# Patient Record
Sex: MALE | Race: BLACK OR AFRICAN AMERICAN | NOT HISPANIC OR LATINO | Employment: OTHER | URBAN - NONMETROPOLITAN AREA
[De-identification: names, ages, dates, MRNs, and addresses within clinical notes are randomized per-mention and may not be internally consistent; named-entity substitution may affect disease eponyms.]

---

## 2022-07-20 ENCOUNTER — OFFICE VISIT (OUTPATIENT)
Dept: GASTROENTEROLOGY | Facility: CLINIC | Age: 62
End: 2022-07-20

## 2022-07-20 VITALS
BODY MASS INDEX: 31.83 KG/M2 | DIASTOLIC BLOOD PRESSURE: 78 MMHG | HEIGHT: 72 IN | HEART RATE: 74 BPM | SYSTOLIC BLOOD PRESSURE: 134 MMHG | OXYGEN SATURATION: 97 % | TEMPERATURE: 98 F | WEIGHT: 235 LBS

## 2022-07-20 DIAGNOSIS — Z01.818 PREOPERATIVE TESTING: Primary | ICD-10-CM

## 2022-07-20 DIAGNOSIS — Z12.11 ENCOUNTER FOR SCREENING FOR MALIGNANT NEOPLASM OF COLON: Primary | ICD-10-CM

## 2022-07-20 DIAGNOSIS — K21.9 GASTROESOPHAGEAL REFLUX DISEASE, UNSPECIFIED WHETHER ESOPHAGITIS PRESENT: ICD-10-CM

## 2022-07-20 PROCEDURE — 99204 OFFICE O/P NEW MOD 45 MIN: CPT | Performed by: NURSE PRACTITIONER

## 2022-07-20 RX ORDER — POLYETHYLENE GLYCOL 3350 17 G/17G
238 POWDER, FOR SOLUTION ORAL ONCE
Qty: 238 G | Refills: 0 | Status: SHIPPED | OUTPATIENT
Start: 2022-07-20 | End: 2022-07-20

## 2022-07-20 RX ORDER — GABAPENTIN 300 MG/1
300 CAPSULE ORAL 3 TIMES DAILY
COMMUNITY

## 2022-07-20 RX ORDER — TRIAMTERENE AND HYDROCHLOROTHIAZIDE 75; 50 MG/1; MG/1
1 TABLET ORAL DAILY
COMMUNITY

## 2022-07-20 NOTE — PROGRESS NOTES
Chief Complaint   Patient presents with   • Colonoscopy     Screening colon       PCP: Joshua Romero MD  REFER: No ref. provider found    Subjective     HPI    Red Carver is a 61 y.o. male who presents to office for preventative maintenance.  There is not  a personal history of colon polyps.  There is not a history of colon cancer.  He does not have complaints of nausea/vomiting, change in bowels, weight loss, no BRBPR, no melena.  There is not a family history of colon cancer in first degree relative.  There is not a family history of colon polyps in first degree relative.  His last colonoscopy-no previous colonoscopy .  Bowels do not move on regular basis.  He will take a laxative at times to help facilitate.  Bowels dependant on what he eats.  He notes if he eats a lot vegetables he doesn't have difficulty.  He notes more constipation if he consumes more protein.    He has intermittent acid reflux.  He was treated for acid reflux in past (10 years ago).  Occasionally has experienced  acidic taste in his mouth more often.  This is noted more when he lies down flat.  No dysphagia.  No current PPI therapy.     Past Medical History:   Diagnosis Date   • Hypertension      History reviewed. No pertinent surgical history.  Outpatient Medications Marked as Taking for the 7/20/22 encounter (Office Visit) with Melecio Basurto APRN   Medication Sig Dispense Refill   • gabapentin (NEURONTIN) 300 MG capsule Take 300 mg by mouth 3 (Three) Times a Day.     • triamterene-hydrochlorothiazide (MAXZIDE) 75-50 MG per tablet Take 1 tablet by mouth Daily.       No Known Allergies  Social History     Socioeconomic History   • Marital status: Single   Tobacco Use   • Smoking status: Former Smoker   • Smokeless tobacco: Never Used   Vaping Use   • Vaping Use: Never used   Substance and Sexual Activity   • Alcohol use: Yes     Comment: rare   • Drug use: Yes     Types: Marijuana     Review of Systems   Constitutional:  Negative for unexpected weight change.   Respiratory: Negative for shortness of breath.    Cardiovascular: Negative for chest pain.   Gastrointestinal: Negative for abdominal pain and anal bleeding.     Objective   Vitals:    07/20/22 0915   BP: 134/78   Pulse: 74   Temp: 98 °F (36.7 °C)   SpO2: 97%     Physical Exam  Constitutional:       Appearance: Normal appearance. He is well-developed.   Eyes:      General: No scleral icterus.  Cardiovascular:      Rate and Rhythm: Regular rhythm.      Heart sounds: Normal heart sounds. No murmur heard.  Pulmonary:      Effort: Pulmonary effort is normal. No accessory muscle usage.      Breath sounds: Normal breath sounds.   Abdominal:      General: Bowel sounds are normal. There is no distension.      Palpations: Abdomen is soft. There is no mass.      Tenderness: There is no abdominal tenderness. There is no guarding or rebound.   Skin:     General: Skin is warm and dry.      Coloration: Skin is not jaundiced.   Neurological:      Mental Status: He is alert.   Psychiatric:         Behavior: Behavior is cooperative.       Imaging Results (Most Recent)     None        Body mass index is 31.87 kg/m².    Assessment & Plan   Diagnoses and all orders for this visit:    1. Encounter for screening for malignant neoplasm of colon (Primary)  -     Case Request; Standing  -     Case Request    2. Gastroesophageal reflux disease, unspecified whether esophagitis present  -     Case Request; Standing  -     Case Request    Other orders  -     polyethylene glycol (MIRALAX) 17 GM/SCOOP powder; Take 238 g by mouth 1 (One) Time for 1 dose. Take as directed  Dispense: 238 g; Refill: 0      COLONOSCOPY WITH ANESTHESIA (N/A), ESOPHAGOGASTRODUODENOSCOPY WITH ANESTHESIA (N/A)    miralax as needed    Defer initiation of PPI after EGD evaluation  Gaviscon/tums prn     Advised pt to stop use of NSAIDs, Fish Oil, and MV 5 days prior to procedure, per Dr Bertrand protocol.  Tylenol based products are ok  to take.  Pt verbalized understanding.     All risks, benefits, alternatives, and indications of colonoscopy procedure have been discussed with the patient. Risks to include perforation of the colon requiring possible surgery or colostomy, risk of bleeding from biopsies or removal of colon tissue, possibility of missing a colon polyp or cancer, or adverse drug reaction.  Benefits to include the diagnosis and management of disease of the colon and rectum. Alternatives to include barium enema, radiographic evaluation, lab testing or no intervention. He verbalizes understanding and agrees.     The risks of the endoscopy were discussed in detail.  We discussed the risks of perforation (one out of 9229-6433, riskier with dilation), bleeding (one out of 500), and the rare risks of infection, adverse reaction to anesthesia, respiratory failure, cardiac failure including MI and adverse reaction to medications, etc.  We discussed consequences that could occur if a risk were to develop such as the need for hospitalization, blood transfusion, surgical intervention, medications, pain and disability and death.  Alternatives include not doing anything, or pursuing an UGI series which only offers a diagnosis with potential less accuracy compared to EGD.  The patient verbalizes understanding and agrees to proceed.  Precautions are currently being put in place due to COVID-19.  I have explained to Red Carver they will be required to undergo COVID testing prior to their EGD.  Red Carver verbalized understanding and was willing to proceed.           Melecio Basurto, APRN  07/20/22        There are no Patient Instructions on file for this visit.

## 2022-07-21 PROBLEM — Z12.11 ENCOUNTER FOR SCREENING FOR MALIGNANT NEOPLASM OF COLON: Status: ACTIVE | Noted: 2022-07-21

## 2022-07-21 PROBLEM — K21.9 GASTROESOPHAGEAL REFLUX DISEASE: Status: ACTIVE | Noted: 2022-07-21

## 2022-08-08 ENCOUNTER — LAB (OUTPATIENT)
Dept: LAB | Facility: HOSPITAL | Age: 62
End: 2022-08-08

## 2022-08-08 LAB — SARS-COV-2 ORF1AB RESP QL NAA+PROBE: NOT DETECTED

## 2022-08-08 PROCEDURE — C9803 HOPD COVID-19 SPEC COLLECT: HCPCS

## 2022-08-08 PROCEDURE — U0004 COV-19 TEST NON-CDC HGH THRU: HCPCS | Performed by: NURSE PRACTITIONER

## 2022-08-11 ENCOUNTER — HOSPITAL ENCOUNTER (OUTPATIENT)
Facility: HOSPITAL | Age: 62
Setting detail: HOSPITAL OUTPATIENT SURGERY
Discharge: HOME | End: 2022-08-11
Attending: INTERNAL MEDICINE | Admitting: INTERNAL MEDICINE

## 2022-08-11 ENCOUNTER — ANESTHESIA EVENT (OUTPATIENT)
Dept: GASTROENTEROLOGY | Facility: HOSPITAL | Age: 62
End: 2022-08-11

## 2022-08-11 ENCOUNTER — ANESTHESIA (OUTPATIENT)
Dept: GASTROENTEROLOGY | Facility: HOSPITAL | Age: 62
End: 2022-08-11

## 2022-08-11 VITALS
HEART RATE: 79 BPM | TEMPERATURE: 97.1 F | BODY MASS INDEX: 31.69 KG/M2 | RESPIRATION RATE: 18 BRPM | OXYGEN SATURATION: 99 % | DIASTOLIC BLOOD PRESSURE: 98 MMHG | WEIGHT: 234 LBS | HEIGHT: 72 IN | SYSTOLIC BLOOD PRESSURE: 125 MMHG

## 2022-08-11 DIAGNOSIS — K21.9 GASTROESOPHAGEAL REFLUX DISEASE, UNSPECIFIED WHETHER ESOPHAGITIS PRESENT: ICD-10-CM

## 2022-08-11 DIAGNOSIS — Z12.11 ENCOUNTER FOR SCREENING FOR MALIGNANT NEOPLASM OF COLON: ICD-10-CM

## 2022-08-11 PROCEDURE — 25010000002 PROPOFOL 10 MG/ML EMULSION: Performed by: NURSE ANESTHETIST, CERTIFIED REGISTERED

## 2022-08-11 PROCEDURE — 87081 CULTURE SCREEN ONLY: CPT | Performed by: INTERNAL MEDICINE

## 2022-08-11 PROCEDURE — 43239 EGD BIOPSY SINGLE/MULTIPLE: CPT | Performed by: INTERNAL MEDICINE

## 2022-08-11 PROCEDURE — 45378 DIAGNOSTIC COLONOSCOPY: CPT | Performed by: INTERNAL MEDICINE

## 2022-08-11 RX ORDER — SODIUM CHLORIDE 9 MG/ML
500 INJECTION, SOLUTION INTRAVENOUS CONTINUOUS PRN
Status: DISCONTINUED | OUTPATIENT
Start: 2022-08-11 | End: 2022-08-11 | Stop reason: HOSPADM

## 2022-08-11 RX ORDER — LIDOCAINE HYDROCHLORIDE 20 MG/ML
INJECTION, SOLUTION EPIDURAL; INFILTRATION; INTRACAUDAL; PERINEURAL AS NEEDED
Status: DISCONTINUED | OUTPATIENT
Start: 2022-08-11 | End: 2022-08-11 | Stop reason: SURG

## 2022-08-11 RX ORDER — SODIUM CHLORIDE 0.9 % (FLUSH) 0.9 %
10 SYRINGE (ML) INJECTION AS NEEDED
Status: DISCONTINUED | OUTPATIENT
Start: 2022-08-11 | End: 2022-08-11 | Stop reason: HOSPADM

## 2022-08-11 RX ORDER — LIDOCAINE HYDROCHLORIDE 10 MG/ML
0.5 INJECTION, SOLUTION EPIDURAL; INFILTRATION; INTRACAUDAL; PERINEURAL ONCE AS NEEDED
Status: CANCELLED | OUTPATIENT
Start: 2022-08-11

## 2022-08-11 RX ORDER — PROPOFOL 10 MG/ML
VIAL (ML) INTRAVENOUS AS NEEDED
Status: DISCONTINUED | OUTPATIENT
Start: 2022-08-11 | End: 2022-08-11 | Stop reason: SURG

## 2022-08-11 RX ADMIN — PROPOFOL 300 MG: 10 INJECTION, EMULSION INTRAVENOUS at 12:38

## 2022-08-11 RX ADMIN — LIDOCAINE HYDROCHLORIDE 50 MG: 20 INJECTION, SOLUTION EPIDURAL; INFILTRATION; INTRACAUDAL; PERINEURAL at 12:38

## 2022-08-11 RX ADMIN — SODIUM CHLORIDE 500 ML: 9 INJECTION, SOLUTION INTRAVENOUS at 12:12

## 2022-08-11 NOTE — ANESTHESIA PREPROCEDURE EVALUATION
Anesthesia Evaluation     Patient summary reviewed and Nursing notes reviewed   NPO Solid Status: > 8 hours  NPO Liquid Status: > 2 hours           Airway   Mallampati: I  TM distance: >3 FB  Neck ROM: full  No difficulty expected  Dental      Pulmonary    Cardiovascular   Exercise tolerance: good (4-7 METS)    (+) hypertension,   (-) CAD      Neuro/Psych  (+) CVA,    GI/Hepatic/Renal/Endo    (+) obesity,  GERD,      Musculoskeletal     Abdominal    Substance History      OB/GYN          Other                      Anesthesia Plan    ASA 2     MAC     intravenous induction     Anesthetic plan, risks, benefits, and alternatives have been provided, discussed and informed consent has been obtained with: patient.        CODE STATUS:

## 2022-08-11 NOTE — ANESTHESIA POSTPROCEDURE EVALUATION
Patient: Red Carver    Procedure Summary     Date: 08/11/22 Room / Location: [facility] ENDOSCOPY 5 / [facility]    Anesthesia Start: 1235 Anesthesia Stop: 1252    Procedures:       COLONOSCOPY WITH ANESTHESIA (N/A )      ESOPHAGOGASTRODUODENOSCOPY WITH ANESTHESIA (N/A ) Diagnosis:       Encounter for screening for malignant neoplasm of colon      Gastroesophageal reflux disease, unspecified whether esophagitis present      (Encounter for screening for malignant neoplasm of colon [Z12.11])      (Gastroesophageal reflux disease, unspecified whether esophagitis present [K21.9])    Surgeons: Douglas Bertrand DO Provider: Francisco Javier Chau CRNA    Anesthesia Type: MAC ASA Status: 2          Anesthesia Type: MAC    Vitals  Vitals Value Taken Time   /77 08/11/22 1251   Temp     Pulse 86 08/11/22 1252   Resp 19 08/11/22 1251   SpO2 96 % 08/11/22 1252   Vitals shown include unvalidated device data.        Post Anesthesia Care and Evaluation    Patient location during evaluation: PACU  Patient participation: complete - patient participated  Level of consciousness: awake and awake and alert  Pain score: 0  Pain management: adequate    Airway patency: patent  Anesthetic complications: No anesthetic complications    Cardiovascular status: acceptable and stable  Respiratory status: acceptable and unassisted  Hydration status: acceptable

## 2022-08-12 LAB — UREASE TISS QL: NEGATIVE

## 2022-08-15 ENCOUNTER — TELEPHONE (OUTPATIENT)
Dept: GASTROENTEROLOGY | Facility: CLINIC | Age: 62
End: 2022-08-15

## 2022-08-31 ENCOUNTER — TELEPHONE (OUTPATIENT)
Dept: GASTROENTEROLOGY | Facility: CLINIC | Age: 62
End: 2022-08-31

## 2023-03-09 ENCOUNTER — APPOINTMENT (OUTPATIENT)
Dept: CT IMAGING | Facility: HOSPITAL | Age: 63
End: 2023-03-09
Payer: OTHER GOVERNMENT

## 2023-03-09 ENCOUNTER — HOSPITAL ENCOUNTER (EMERGENCY)
Facility: HOSPITAL | Age: 63
Discharge: HOME | End: 2023-03-09
Attending: EMERGENCY MEDICINE | Admitting: EMERGENCY MEDICINE
Payer: OTHER GOVERNMENT

## 2023-03-09 ENCOUNTER — APPOINTMENT (OUTPATIENT)
Dept: ULTRASOUND IMAGING | Facility: HOSPITAL | Age: 63
End: 2023-03-09
Payer: OTHER GOVERNMENT

## 2023-03-09 VITALS
SYSTOLIC BLOOD PRESSURE: 167 MMHG | OXYGEN SATURATION: 98 % | WEIGHT: 226 LBS | HEART RATE: 98 BPM | DIASTOLIC BLOOD PRESSURE: 112 MMHG | BODY MASS INDEX: 30.61 KG/M2 | TEMPERATURE: 97.9 F | HEIGHT: 72 IN | RESPIRATION RATE: 16 BRPM

## 2023-03-09 DIAGNOSIS — I10 CHRONIC HYPERTENSION: ICD-10-CM

## 2023-03-09 DIAGNOSIS — N40.0 ENLARGED PROSTATE: ICD-10-CM

## 2023-03-09 DIAGNOSIS — K57.92 DIVERTICULITIS: ICD-10-CM

## 2023-03-09 DIAGNOSIS — K52.9 COLITIS: Primary | ICD-10-CM

## 2023-03-09 LAB
ALBUMIN SERPL-MCNC: 4.7 G/DL (ref 3.5–5.2)
ALBUMIN/GLOB SERPL: 1.2 G/DL
ALP SERPL-CCNC: 70 U/L (ref 39–117)
ALT SERPL W P-5'-P-CCNC: 18 U/L (ref 1–41)
ANION GAP SERPL CALCULATED.3IONS-SCNC: 14 MMOL/L (ref 5–15)
AST SERPL-CCNC: 22 U/L (ref 1–40)
BASOPHILS # BLD AUTO: 0.01 10*3/MM3 (ref 0–0.2)
BASOPHILS NFR BLD AUTO: 0.1 % (ref 0–1.5)
BILIRUB SERPL-MCNC: 0.4 MG/DL (ref 0–1.2)
BILIRUB UR QL STRIP: NEGATIVE
BUN SERPL-MCNC: 16 MG/DL (ref 8–23)
BUN/CREAT SERPL: 16.2 (ref 7–25)
CALCIUM SPEC-SCNC: 9.6 MG/DL (ref 8.6–10.5)
CHLORIDE SERPL-SCNC: 99 MMOL/L (ref 98–107)
CLARITY UR: CLEAR
CO2 SERPL-SCNC: 25 MMOL/L (ref 22–29)
COLOR UR: YELLOW
CREAT SERPL-MCNC: 0.99 MG/DL (ref 0.76–1.27)
DEPRECATED RDW RBC AUTO: 47.5 FL (ref 37–54)
EGFRCR SERPLBLD CKD-EPI 2021: 85.1 ML/MIN/1.73
EOSINOPHIL # BLD AUTO: 0 10*3/MM3 (ref 0–0.4)
EOSINOPHIL NFR BLD AUTO: 0 % (ref 0.3–6.2)
ERYTHROCYTE [DISTWIDTH] IN BLOOD BY AUTOMATED COUNT: 15.1 % (ref 12.3–15.4)
GLOBULIN UR ELPH-MCNC: 3.9 GM/DL
GLUCOSE SERPL-MCNC: 175 MG/DL (ref 65–99)
GLUCOSE UR STRIP-MCNC: NEGATIVE MG/DL
HCT VFR BLD AUTO: 41.6 % (ref 37.5–51)
HGB BLD-MCNC: 13.5 G/DL (ref 13–17.7)
HGB UR QL STRIP.AUTO: NEGATIVE
IMM GRANULOCYTES # BLD AUTO: 0.05 10*3/MM3 (ref 0–0.05)
IMM GRANULOCYTES NFR BLD AUTO: 0.4 % (ref 0–0.5)
KETONES UR QL STRIP: NEGATIVE
LEUKOCYTE ESTERASE UR QL STRIP.AUTO: NEGATIVE
LIPASE SERPL-CCNC: 44 U/L (ref 13–60)
LYMPHOCYTES # BLD AUTO: 0.6 10*3/MM3 (ref 0.7–3.1)
LYMPHOCYTES NFR BLD AUTO: 4.5 % (ref 19.6–45.3)
MCH RBC QN AUTO: 28 PG (ref 26.6–33)
MCHC RBC AUTO-ENTMCNC: 32.5 G/DL (ref 31.5–35.7)
MCV RBC AUTO: 86.3 FL (ref 79–97)
MONOCYTES # BLD AUTO: 0.44 10*3/MM3 (ref 0.1–0.9)
MONOCYTES NFR BLD AUTO: 3.3 % (ref 5–12)
NEUTROPHILS NFR BLD AUTO: 12.31 10*3/MM3 (ref 1.7–7)
NEUTROPHILS NFR BLD AUTO: 91.7 % (ref 42.7–76)
NITRITE UR QL STRIP: NEGATIVE
NRBC BLD AUTO-RTO: 0 /100 WBC (ref 0–0.2)
PH UR STRIP.AUTO: 6.5 [PH] (ref 5–8)
PLATELET # BLD AUTO: 378 10*3/MM3 (ref 140–450)
PMV BLD AUTO: 9.6 FL (ref 6–12)
POTASSIUM SERPL-SCNC: 3.8 MMOL/L (ref 3.5–5.2)
PROT SERPL-MCNC: 8.6 G/DL (ref 6–8.5)
PROT UR QL STRIP: ABNORMAL
RBC # BLD AUTO: 4.82 10*6/MM3 (ref 4.14–5.8)
SODIUM SERPL-SCNC: 138 MMOL/L (ref 136–145)
SP GR UR STRIP: 1.02 (ref 1–1.03)
UROBILINOGEN UR QL STRIP: ABNORMAL
WBC NRBC COR # BLD: 13.41 10*3/MM3 (ref 3.4–10.8)

## 2023-03-09 PROCEDURE — 25010000002 ONDANSETRON PER 1 MG: Performed by: EMERGENCY MEDICINE

## 2023-03-09 PROCEDURE — 93005 ELECTROCARDIOGRAM TRACING: CPT | Performed by: EMERGENCY MEDICINE

## 2023-03-09 PROCEDURE — 85025 COMPLETE CBC W/AUTO DIFF WBC: CPT | Performed by: EMERGENCY MEDICINE

## 2023-03-09 PROCEDURE — 76705 ECHO EXAM OF ABDOMEN: CPT

## 2023-03-09 PROCEDURE — 74177 CT ABD & PELVIS W/CONTRAST: CPT

## 2023-03-09 PROCEDURE — 83690 ASSAY OF LIPASE: CPT | Performed by: EMERGENCY MEDICINE

## 2023-03-09 PROCEDURE — 96374 THER/PROPH/DIAG INJ IV PUSH: CPT

## 2023-03-09 PROCEDURE — 93010 ELECTROCARDIOGRAM REPORT: CPT | Performed by: INTERNAL MEDICINE

## 2023-03-09 PROCEDURE — 81003 URINALYSIS AUTO W/O SCOPE: CPT | Performed by: EMERGENCY MEDICINE

## 2023-03-09 PROCEDURE — 25510000001 IOPAMIDOL 61 % SOLUTION: Performed by: EMERGENCY MEDICINE

## 2023-03-09 PROCEDURE — 25010000002 MORPHINE PER 10 MG: Performed by: EMERGENCY MEDICINE

## 2023-03-09 PROCEDURE — 96375 TX/PRO/DX INJ NEW DRUG ADDON: CPT

## 2023-03-09 PROCEDURE — 80053 COMPREHEN METABOLIC PANEL: CPT | Performed by: EMERGENCY MEDICINE

## 2023-03-09 PROCEDURE — 99283 EMERGENCY DEPT VISIT LOW MDM: CPT

## 2023-03-09 RX ORDER — CIPROFLOXACIN 500 MG/1
500 TABLET, FILM COATED ORAL 2 TIMES DAILY
Qty: 20 TABLET | Refills: 0 | Status: SHIPPED | OUTPATIENT
Start: 2023-03-09

## 2023-03-09 RX ORDER — SODIUM CHLORIDE 0.9 % (FLUSH) 0.9 %
10 SYRINGE (ML) INJECTION AS NEEDED
Status: DISCONTINUED | OUTPATIENT
Start: 2023-03-09 | End: 2023-03-09 | Stop reason: HOSPADM

## 2023-03-09 RX ORDER — HYDROCODONE BITARTRATE AND ACETAMINOPHEN 7.5; 325 MG/1; MG/1
1 TABLET ORAL EVERY 8 HOURS PRN
Qty: 10 TABLET | Refills: 0 | Status: SHIPPED | OUTPATIENT
Start: 2023-03-09

## 2023-03-09 RX ORDER — ONDANSETRON 4 MG/1
4 TABLET, ORALLY DISINTEGRATING ORAL EVERY 8 HOURS PRN
Qty: 12 TABLET | Refills: 0 | Status: SHIPPED | OUTPATIENT
Start: 2023-03-09

## 2023-03-09 RX ORDER — METRONIDAZOLE 500 MG/1
500 TABLET ORAL 3 TIMES DAILY
Qty: 30 TABLET | Refills: 0 | Status: SHIPPED | OUTPATIENT
Start: 2023-03-09

## 2023-03-09 RX ORDER — ONDANSETRON 2 MG/ML
4 INJECTION INTRAMUSCULAR; INTRAVENOUS ONCE
Status: COMPLETED | OUTPATIENT
Start: 2023-03-09 | End: 2023-03-09

## 2023-03-09 RX ADMIN — MORPHINE SULFATE 4 MG: 4 INJECTION, SOLUTION INTRAMUSCULAR; INTRAVENOUS at 12:12

## 2023-03-09 RX ADMIN — ONDANSETRON 4 MG: 2 INJECTION INTRAMUSCULAR; INTRAVENOUS at 12:12

## 2023-03-09 RX ADMIN — SODIUM CHLORIDE 500 ML: 0.9 INJECTION, SOLUTION INTRAVENOUS at 12:12

## 2023-03-09 RX ADMIN — IOPAMIDOL 100 ML: 612 INJECTION, SOLUTION INTRAVENOUS at 12:57

## 2023-03-09 NOTE — DISCHARGE INSTRUCTIONS
Follow up with one of the [facility] physician groups below to setup primary care. If you have trouble making an appointment, please call the [facility] Nurse Line at [phone]    [facility] - [address]  [id number] [phone]    [facility] - [address], [address], [place], [place] [address] [phone]    [facility] - [address] [place] [address], [address], [place] [address] [phone]     [facility] ([facility] - [address] [facility], [address], [place] [address] [phone]    [facility] - [address], [address] [phone]    [facility] - [address], [place] [address] [phone]    [facility] - [place]  [address], [place], [address] [phone]    [facility] - [place]  [address], [address] [phone]    [facility] - [address] [address] [phone]    [facility] - [place]  [address] [place] [address], [place], [address] [phone]        PEDIATRIC:    [facility] - [address] [address], [address], [place] [address] [phone]

## 2023-03-09 NOTE — ED PROVIDER NOTES
"Subjective   History of Present Illness  Patient is a 62-year-old male with a history of hypertension and CVA who presents to the ER with abdominal pain.  Patient states he has had mid and epigastric abdominal pain that he describes as a hurt since last night, progressively worsening.  He has had associated nausea, vomiting and chills.  He denies any fever, chest pain, shortness of air, diarrhea, urinary changes, neurologic changes.  Patient has never had pain like this previously.        Review of Systems   Constitutional: Positive for chills.   HENT: Negative.    Eyes: Negative.    Respiratory: Negative.    Cardiovascular: Negative.    Gastrointestinal: Positive for abdominal pain, nausea and vomiting.   Endocrine: Negative.    Genitourinary: Negative.    Musculoskeletal: Negative.    Skin: Negative.    Allergic/Immunologic: Negative.    Neurological: Negative.    Hematological: Negative.    Psychiatric/Behavioral: Negative.    All other systems reviewed and are negative.      Past Medical History:   Diagnosis Date   • Hypertension    • Stroke (HCC)     right, \"tightness, stiff\"       No Known Allergies    Past Surgical History:   Procedure Laterality Date   • COLONOSCOPY     • COLONOSCOPY N/A 8/11/2022    Procedure: COLONOSCOPY WITH ANESTHESIA;  Surgeon: Douglas Bertrand DO;  Location: [facility] ENDOSCOPY;  Service: Gastroenterology;  Laterality: N/A;  pre: screen  post:normal  Joshua Romero MD       • ENDOSCOPY     • ENDOSCOPY N/A 8/11/2022    Procedure: ESOPHAGOGASTRODUODENOSCOPY WITH ANESTHESIA;  Surgeon: Douglas Bertrand DO;  Location: [facility] ENDOSCOPY;  Service: Gastroenterology;  Laterality: N/A;  pre: reflux  post: normal  Joshua Romero MD       Family History   Problem Relation Age of Onset   • Colon cancer Neg Hx    • Colon polyps Neg Hx    • Esophageal cancer Neg Hx        Social History     Socioeconomic History   • Marital status: Single   Tobacco Use   • Smoking status: Former   • Smokeless " tobacco: Never   Vaping Use   • Vaping Use: Never used   Substance and Sexual Activity   • Alcohol use: Yes     Comment: rare   • Drug use: Yes     Types: Marijuana   • Sexual activity: Defer           Objective   Physical Exam  Vitals and nursing note reviewed.   Constitutional:       Appearance: He is well-developed.   HENT:      Head: Normocephalic and atraumatic.   Eyes:      Conjunctiva/sclera: Conjunctivae normal.      Pupils: Pupils are equal, round, and reactive to light.   Cardiovascular:      Rate and Rhythm: Normal rate and regular rhythm.      Heart sounds: Normal heart sounds.   Pulmonary:      Effort: Pulmonary effort is normal.      Breath sounds: Normal breath sounds.   Abdominal:      Palpations: Abdomen is soft.      Tenderness: There is abdominal tenderness in the epigastric area. There is no right CVA tenderness, left CVA tenderness, guarding or rebound.   Musculoskeletal:         General: No deformity. Normal range of motion.      Cervical back: Normal range of motion.   Skin:     General: Skin is warm.   Neurological:      Mental Status: He is alert and oriented to person, place, and time.   Psychiatric:         Behavior: Behavior normal.         Procedures           ED Course      EKG: Normal sinus rhythm with a rate of 87, no acute ischemia or infarction         Lab Results (last 24 hours)     Procedure Component Value Units Date/Time    CBC & Differential [[id number]]  (Abnormal) Collected: 03/09/23 1200    Specimen: Blood Updated: 03/09/23 1207    Narrative:      The following orders were created for panel order CBC & Differential.  Procedure                               Abnormality         Status                     ---------                               -----------         ------                     CBC Auto Differential[[id number]]        Abnormal            Final result                 Please view results for these tests on the individual orders.    Comprehensive Metabolic Panel  [[id number]]  (Abnormal) Collected: 03/09/23 1200    Specimen: Blood Updated: 03/09/23 1228     Glucose 175 mg/dL      BUN 16 mg/dL      Creatinine 0.99 mg/dL      Sodium 138 mmol/L      Potassium 3.8 mmol/L      Chloride 99 mmol/L      CO2 25.0 mmol/L      Calcium 9.6 mg/dL      Total Protein 8.6 g/dL      Albumin 4.7 g/dL      ALT (SGPT) 18 U/L      AST (SGOT) 22 U/L      Alkaline Phosphatase 70 U/L      Total Bilirubin 0.4 mg/dL      Globulin 3.9 gm/dL      A/G Ratio 1.2 g/dL      BUN/Creatinine Ratio 16.2     Anion Gap 14.0 mmol/L      eGFR 85.1 mL/min/1.73     Narrative:      GFR Normal >60  Chronic Kidney Disease <60  Kidney Failure <15      Lipase [[id number]]  (Normal) Collected: 03/09/23 1200    Specimen: Blood Updated: 03/09/23 1228     Lipase 44 U/L     CBC Auto Differential [[id number]]  (Abnormal) Collected: 03/09/23 1200    Specimen: Blood Updated: 03/09/23 1207     WBC 13.41 10*3/mm3      RBC 4.82 10*6/mm3      Hemoglobin 13.5 g/dL      Hematocrit 41.6 %      MCV 86.3 fL      MCH 28.0 pg      MCHC 32.5 g/dL      RDW 15.1 %      RDW-SD 47.5 fl      MPV 9.6 fL      Platelets 378 10*3/mm3      Neutrophil % 91.7 %      Lymphocyte % 4.5 %      Monocyte % 3.3 %      Eosinophil % 0.0 %      Basophil % 0.1 %      Immature Grans % 0.4 %      Neutrophils, Absolute 12.31 10*3/mm3      Lymphocytes, Absolute 0.60 10*3/mm3      Monocytes, Absolute 0.44 10*3/mm3      Eosinophils, Absolute 0.00 10*3/mm3      Basophils, Absolute 0.01 10*3/mm3      Immature Grans, Absolute 0.05 10*3/mm3      nRBC 0.0 /100 WBC     Urinalysis With Culture If Indicated - Urine, Clean Catch [[id number]]  (Abnormal) Collected: 03/09/23 1223    Specimen: Urine, Clean Catch Updated: 03/09/23 1245     Color, UA Yellow     Appearance, UA Clear     pH, UA 6.5     Specific Gravity, UA 1.024     Glucose, UA Negative     Ketones, UA Negative     Bilirubin, UA Negative     Blood, UA Negative     Protein, UA Trace     Leuk Esterase, UA Negative      Nitrite, UA Negative     Urobilinogen, UA 0.2 E.U./dL    Narrative:      In absence of clinical symptoms, the presence of pyuria, bacteria, and/or nitrites on the urinalysis result does not correlate with infection.  Urine microscopic not indicated.        US Gallbladder   Final Result       1. Normal gallbladder without gallbladder wall thickening, gallstones or   biliary dilatation.   2. Cortical cyst upper pole right kidney.   3. Otherwise normal right upper quadrant ultrasound.           This report was finalized on 03/09/2023 14:14 by Dr. Shane Petty MD.      CT Abdomen Pelvis With Contrast   Final Result   1. The changes in the cecum, ascending and proximal transverse colon may   represent acute colitis. There are several large diverticula in the   ascending colon in the area of the inflammatory process. The possibility   of an incidental diverticulitis is not excluded. No abscess formation.   No free air.   2. The changes in the sigmoid colon may partly be due to incomplete   distention the probability of acute nonspecific colitis may not be   excluded. There appears to be adhesions between the loop of sigmoid   colon and the cecum (complete loss of fat planes).   3. Enlarged prostate which is protruding into the floor of the urinary   bladder.   4. Incompletely distended thick-walled gallbladder.   5. Incompletely evaluated low-density nodules in both kidneys.                       This report was finalized on 03/09/2023 13:24 by Dr. Nolan Nair MD.                              MATTHEW reviewed by Farideh Pickett MD       Medical Decision Making  Patient is a 62-year-old male with a history of hypertension and CVA who presents to the ER with abdominal pain.  Patient states he has had mid and epigastric abdominal pain that he describes as a hurt since last night, progressively worsening.  He has had associated nausea, vomiting and chills.  He denies any fever, chest pain, shortness of air,  diarrhea, urinary changes, neurologic changes.  Patient has never had pain like this previously.    Differential diagnosis: Cholecystitis, appendicitis, gastritis, peptic ulcer disease, diverticulitis    Patient was given IV fluids, morphine and Zofran.  Patient was feeling significantly better after treatment.  Labs show leukocytosis and hyperglycemia.  CT scan of the abdomen pelvis showed changes in the cecum, ascending and proximal transverse colon that represents acute colitis.  The possibility of an incidental diverticulitis could not be excluded because there are several large diverticuli in the area as well.  There is no evidence of abscess.  Patient also had an enlarged prostate, an incompletely distended gallbladder that was thick-walled, and bilateral renal nodules.  Ultrasound of the gallbladder was then performed and was unremarkable.  Patient did have a cortical cyst in the upper pole of the right kidney.  Patient was feeling better.  Work-up consistent with colitis versus diverticulitis.  Patient was informed of the enlarged prostate.  He also had elevated blood pressure but has a history of hypertension.  Patient will be discharged home with Cipro, Flagyl, Zofran and a short course of Lortab for pain control.  He is to follow-up with his PCP and is to return to the ER immediately for any worse or new pain, fever or other concerns.  Patient is to get his blood pressure rechecked as well.  Patient agreeable.    Chronic hypertension: chronic illness or injury  Colitis: acute illness or injury  Diverticulitis: acute illness or injury  Enlarged prostate: acute illness or injury  Amount and/or Complexity of Data Reviewed  Labs: ordered. Decision-making details documented in ED Course.  Radiology: ordered. Decision-making details documented in ED Course.  ECG/medicine tests: ordered. Decision-making details documented in ED Course.      Risk  Prescription drug management.          Final diagnoses:   Colitis    Diverticulitis   Enlarged prostate   Chronic hypertension       ED Disposition  ED Disposition     ED Disposition   Discharge    Condition   Good    Comment   --             Provider, No Known  [facility]  [facility] [id number] [phone]    Schedule an appointment as soon as possible for a visit            Medication List      New Prescriptions    ciprofloxacin 500 MG tablet  Commonly known as: CIPRO  Take 1 tablet by mouth 2 (Two) Times a Day.     HYDROcodone-acetaminophen 7.5-325 MG per tablet  Commonly known as: NORCO  Take 1 tablet by mouth Every 8 (Eight) Hours As Needed for Moderate Pain.     metroNIDAZOLE 500 MG tablet  Commonly known as: FLAGYL  Take 1 tablet by mouth 3 (Three) Times a Day.     ondansetron ODT 4 MG disintegrating tablet  Commonly known as: ZOFRAN-ODT  Place 1 tablet on the tongue Every 8 (Eight) Hours As Needed for Nausea or Vomiting.           Where to Get Your Medications      These medications were sent to [facility]/pharmacy #[id number] - [place], [place] - [address] AT ACROSS FROM [address]  [phone] [facility] [phone] [facility] [address] [facility] [address]    Phone: [phone]   · ciprofloxacin 500 MG tablet  · HYDROcodone-acetaminophen 7.5-325 MG per tablet  · metroNIDAZOLE 500 MG tablet  · ondansetron ODT 4 MG disintegrating tablet          Farideh Pickett MD  03/09/23 8188

## 2023-03-10 LAB
QT INTERVAL: 402 MS
QTC INTERVAL: 483 MS

## 2024-06-21 ENCOUNTER — TRANSCRIBE ORDERS (OUTPATIENT)
Dept: PHYSICAL THERAPY | Facility: CLINIC | Age: 64
End: 2024-06-21
Payer: OTHER GOVERNMENT

## 2024-06-21 DIAGNOSIS — M25.512 LEFT SHOULDER PAIN, UNSPECIFIED CHRONICITY: Primary | ICD-10-CM

## 2024-07-10 ENCOUNTER — TREATMENT (OUTPATIENT)
Dept: PHYSICAL THERAPY | Facility: CLINIC | Age: 64
End: 2024-07-10
Payer: OTHER GOVERNMENT

## 2024-07-10 DIAGNOSIS — M25.812 IMPINGEMENT OF LEFT SHOULDER: Primary | ICD-10-CM

## 2024-07-10 DIAGNOSIS — G89.29 CHRONIC LEFT SHOULDER PAIN: ICD-10-CM

## 2024-07-10 DIAGNOSIS — M25.512 CHRONIC LEFT SHOULDER PAIN: ICD-10-CM

## 2024-07-10 NOTE — PROGRESS NOTES
"                          Physical Therapy Initial Evaluation and Plan of Care  [address] [place] [address]    Patient: Red Carver               : 1960  Visit Date: 7/10/2024  Referring practitioner: AME Wells*  Date of Initial Visit: 7/10/2024  Patient seen for 1 sessions    Visit Diagnoses:    ICD-10-CM ICD-9-CM   1. Impingement of left shoulder  M25.812 719.81   2. Chronic left shoulder pain  M25.512 719.41    G89.29 338.29     Past Medical History:   Diagnosis Date    Hypertension     Stroke     right, \"tightness, stiff\"     Past Surgical History:   Procedure Laterality Date    COLONOSCOPY      COLONOSCOPY N/A 2022    Procedure: COLONOSCOPY WITH ANESTHESIA;  Surgeon: Douglas Bertrand DO;  Location: [facility] ENDOSCOPY;  Service: Gastroenterology;  Laterality: N/A;  pre: screen  post:normal  Joshua Romero MD        ENDOSCOPY      ENDOSCOPY N/A 2022    Procedure: ESOPHAGOGASTRODUODENOSCOPY WITH ANESTHESIA;  Surgeon: Douglas Bertrand DO;  Location: [facility] ENDOSCOPY;  Service: Gastroenterology;  Laterality: N/A;  pre: reflux  post: normal  Joshua Romero MD         SUBJECTIVE     Subjective Evaluation    History of Present Illness  Onset date: years ago.  Mechanism of injury: His left shoulder has been busted up for a long time. 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 Now, when he lifts it, it gets stuck and then he gets a pain in the posterior aspect of the shoulder, moves it around a bit, and can continue raising it and then he has pain at the point of the shoulder.  He also has a former stroke that affects the right side, causing n/t       Patient Occupation: retired Quality of life: good    Pain  Current pain ratin  At best pain ratin (at night)  At worst pain ratin  Location: L shoulder  Quality: sharp (shooting)  Relieving factors: medications, relaxation and rest  Aggravating factors: lifting, outstretched reach, " repetitive movement, overhead activity and movement    Hand dominance: right    Patient Goals  Patient goals for therapy: decreased pain, increased strength and increased motion  Patient goal: able to raise the arm and do normal things       Outcome Measure:   PT G-Codes  Outcome Measure Options: Quick DASH  Quick DASH Score: 56.82    OBJECTIVE     Objective     POSTURAL ASSESSMENT/OBSERVATIONS:   rounded shoulders    PALPATION:  LEFT:  only tenderness subscap region, AC joint         UE ROM and MMT Left Right   SHOULDER AROM PROM MMT AROM PROM MMT   Flexion 124*  3+ 131*  4+   Extension   3+   5   *  4 136  5   ER Sup scap   4+ Sup scap   4-   IR Inf scap  3+ Inf scap  4            *pain     SPECIAL TESTS  LEFT: Empty can (negative) Full can (negative) Marion/Michael (positive) Neer's (positive) AC shear (positive) Drop arm test (negative) Apprehension test (negative) Spurlings (negative) O'daryl's (negative) passive compression (negative)      Therapy Education/Self Care [id number]   Education offered today Nature of condition, POC moving forward, taping of L ACJ   Medbride Code    Ongoing HEP   Access Code: [email or web address]  URL: [email or web address]  Date: 07/10/2024  Prepared by: Krystyna Mccormack    Exercises  - Supine Shoulder Flexion AAROM with Hands Clasped  - 1 x daily - 7 x weekly - 3 sets - 10 reps  - Standing Shoulder Abduction AAROM with Dowel  - 1 x daily - 7 x weekly - 3 sets - 10 reps  - Seated Scapular Retraction  - 1 x daily - 7 x weekly - 3 sets - 10 reps - 5 hold  - Corner Pec Major Stretch  - 1 x daily - 7 x weekly - 3 reps - 30-60 sec hold   Timed Minutes        Total Timed Treatment:     0   mins  Total Time of Visit:            45   mins    ASSESSMENT/PLAN     GOALS:  Goals                                                    Progress Note due by 8/9/24                                                                Recert due by 10/7/24   STG by: 4 weeks Comments Date Status   Decrease  subjective pain to 2/10       Decreased muscle guarding  throughout shoulder girdle                      LTG by: 8 weeks       Symmetrical valerie shoulder flexion and abduction to at least 150 deg actively to improve ability to reach into overhead cabinets       Able to resume household and work activities without exacerbation of symptoms      Gross shoulder MMT at least 4+/5 to improve ability to lift items and groceries at home       Understands improved ergonomics for work and HEP for flexibility and stability       Improve QuickDASH score to 20 or less  56.82     Reports no pain except occasional minor twinges no more than 2/10         Assessment & Plan       Assessment  Impairments: abnormal muscle firing, abnormal or restricted ROM, activity intolerance, impaired physical strength, lacks appropriate home exercise program and pain with function   Functional limitations: carrying objects, lifting, pulling, pushing, uncomfortable because of pain, reaching behind back, reaching overhead and unable to perform repetitive tasks   Assessment details: Pt is a 62 y/o male presenting with L shoulder pain, weakness, and ROM deficits. Special testing is consistent with L AC joint impingement in addition to tightness and significant TP activity throughout L subscap. He experiences significant creptius toward end ROM flex and abd in the ACJ, and both active and passive ROM were improved with L subscap release today. Despite sticking of shoulder, condition is not consistent with labral tear at this time. End feels were normal, despite guarding and pain toward end range flexion and abduction. His condition prevents him from being able to perform normal ADLs with LUE without increased pain, and he is currently unable to do any lifting with the LUE due to weakness and pain.  The Pt would benefit from skilled PTx to decrease pain, improve functional mobility, progress toward goals, and increase overall quality of life.    Prognosis:  good    Plan  Therapy options: will be seen for skilled therapy services  Planned modality interventions: cryotherapy, dry needling, low level laser therapy, iontophoresis, TENS, electrical stimulation/Russian stimulation, high voltage pulsed current (pain management), high voltage pulsed current (spasm management), hydrotherapy and ultrasound  Planned therapy interventions: ADL retraining, body mechanics training, fine motor coordination training, flexibility, functional ROM exercises, home exercise program, IADL retraining, joint mobilization, manual therapy, motor coordination training, neuromuscular re-education, postural training, soft tissue mobilization, strengthening, stretching and therapeutic activities  Frequency: 2x week  Duration in weeks: 8  Treatment plan discussed with: patient  Plan details: Assess response to initial HEP and taping to L ACJ. Work toward decreasing muscle guarding throughout shoulder, and increasing L shoulder strengthening as well. Promote proper scapulothoracic rhythm.         SIGNATURE: Krystyna Mccormack PT [place], [place] License #: [id number]      Electronically Signed on 7/10/2024      Initial Certification  Certification Period: 7/10/2024 through 10/7/2024  I certify that the therapy services are furnished while this patient is under my care.  The services outlined above are required by this patient, and will be reviewed every 90 days.     PHYSICIAN: Manuelito Perez PA-C (NPI: [id number])    Signature____________________________________________DATE: _________     Please sign and return via fax to [phone].   [unfilled]          [address] [place] [address] [place] [address]  [phone]

## 2024-07-30 ENCOUNTER — TREATMENT (OUTPATIENT)
Dept: PHYSICAL THERAPY | Facility: CLINIC | Age: 64
End: 2024-07-30
Payer: OTHER GOVERNMENT

## 2024-07-30 DIAGNOSIS — M25.812 IMPINGEMENT OF LEFT SHOULDER: Primary | ICD-10-CM

## 2024-07-30 DIAGNOSIS — M25.512 CHRONIC LEFT SHOULDER PAIN: ICD-10-CM

## 2024-07-30 DIAGNOSIS — G89.29 CHRONIC LEFT SHOULDER PAIN: ICD-10-CM

## 2024-07-30 NOTE — PROGRESS NOTES
Physical Therapy Treatment Note  [address] [place] [address]    Patient: Red Carver                                                 Visit Date: 2024  :     1960    Referring practitioner:    AME Wells*  Date of Initial Visit:          Type: THERAPY  Noted: 7/10/2024    Patient seen for 2 sessions    Visit Diagnoses:    ICD-10-CM ICD-9-CM   1. Impingement of left shoulder  M25.812 719.81   2. Chronic left shoulder pain  M25.512 719.41    G89.29 338.29     SUBJECTIVE     Subjective: He performs his HEP daily and it's helping. The tape helped. His R shoulder hurts every day. At rest, he doesn't have any pain in his L shoulder but he does when he is raising it. He bought his own tape and has been taping it himself. He's been trying to get a referral for his R shoulder because he is adjusting to his L shoulder.       PAIN: 7/10 when raising L shoulder, no pain at rest in L shoulder      OBJECTIVE     Objective     Therapeutic Exercises    96999 Units Comments   L UE isometric SB presses into flexion  2x10    L UE isometric SB presses into abduction  2x10    L UE isometric SB presses into ER  2x10    L UE isometric SB presses into IR  2x10    L SA punches   2x10     L openbooks in SL   2x10    L UE isometric in 90 deg flexion w/ R horizontal abd against red TB   2x10               Timed Minutes 24      Manual Therapy     92130  Comments   STM w/ massage cream to L posterior shoulder prone   Thoracic PA mobs     L shoulder PROM into abd and flex             Timed Minutes 16     Therapy Education/Self Care 05677   Education offered today Nature of condition, POC moving forward, taping of L ACJ   Medbride Code     Ongoing HEP   Access Code: [email or web address]  URL: [email or web address]  Date: 07/10/2024  Prepared by: Krystyna Mccormack     Exercises  - Supine Shoulder Flexion AAROM with Hands Clasped  - 1 x daily - 7 x weekly  - 3 sets - 10 reps  - Standing Shoulder Abduction AAROM with Dowel  - 1 x daily - 7 x weekly - 3 sets - 10 reps  - Seated Scapular Retraction  - 1 x daily - 7 x weekly - 3 sets - 10 reps - 5 hold  - Corner Pec Major Stretch  - 1 x daily - 7 x weekly - 3 reps - 30-60 sec hold   Timed Minutes         Total Timed Treatment:     40   mins  Total Time of Visit:             40   mins         ASSESSMENT/PLAN     GOALS  Goals                                                    Progress Note due by 8/9/24                                                                Recert due by 10/7/24   STG by: 4 weeks Comments Date Status   Decrease subjective pain to 2/10         Decreased muscle guarding  throughout shoulder girdle  guarded posteriorly  7/30                         LTG by: 8 weeks         Symmetrical valerie shoulder flexion and abduction to at least 150 deg actively to improve ability to reach into overhead cabinets         Able to resume household and work activities without exacerbation of symptoms         Gross shoulder MMT at least 4+/5 to improve ability to lift items and groceries at home          Understands improved ergonomics for work and HEP for flexibility and stability         Improve QuickDASH score to 20 or less  56.82       Reports no pain except occasional minor twinges no more than 2/10             Assessment/Plan     ASSESSMENT:   Today was Red's first visit since his initial evaluation, therefore no goals have been met at this time. We focused primarily on L UE stability. He was very good about working in pain-free ranges. Increased guarding was found posteriorly. Pt is working on getting a referral to add his R shoulder to his current POC and I feel this would be appropriate.     PLAN:   Check and see if he has a referral for his R shoulder and add to POC if able. Continue to appropriately progress L UE strength and ROM.     SIGNATURE: Elena Gill PTA, [place] License #: [id number]  Electronically Signed  on 7/30/2024        [address] [place] [address]  [phone]

## 2024-08-01 ENCOUNTER — TREATMENT (OUTPATIENT)
Dept: PHYSICAL THERAPY | Facility: CLINIC | Age: 64
End: 2024-08-01
Payer: OTHER GOVERNMENT

## 2024-08-01 DIAGNOSIS — M25.512 CHRONIC LEFT SHOULDER PAIN: ICD-10-CM

## 2024-08-01 DIAGNOSIS — M25.812 IMPINGEMENT OF LEFT SHOULDER: Primary | ICD-10-CM

## 2024-08-01 DIAGNOSIS — G89.29 CHRONIC LEFT SHOULDER PAIN: ICD-10-CM

## 2024-08-01 NOTE — PROGRESS NOTES
Physical Therapy Treatment Note  [address] [place] [address]    Patient: Red Carver                                                 Visit Date: 2024  :     1960    Referring practitioner:    AME Wells*  Date of Initial Visit:          Type: THERAPY  Noted: 7/10/2024    Patient seen for 3 sessions    Visit Diagnoses:    ICD-10-CM ICD-9-CM   1. Impingement of left shoulder  M25.812 719.81   2. Chronic left shoulder pain  M25.512 719.41    G89.29 338.29     SUBJECTIVE     Subjective: States he has been feeling better, and has been doing his exercises at home. Notes he is able to raise his arm a little easier.       PAIN: 5/10 when raising L shoulder, no pain at rest in L shoulder      OBJECTIVE     Objective     Therapeutic Exercises    48446 Units Comments   Scapular depression @ cybex L3 2x10    Straight arm pulldown @ cybex L3 2x10    Lat pulldown @ cybex L3 x20    IR/ER walkouts @ cybex L1 2X10 ea 2-3 steps   Timed Minutes 22     Neuromuscular Reeducation     54004 Comments   Body blade: sh flex up/down & side/side; ER/ER; sh ABD up/down & side/side 30 sec ea   Stir the pots w/ SB on table X10 ea   BUE banded pulses into flex w/ RTB x10       Timed Minutes 12        Manual Therapy     14692  Comments   L shoulder PROM into abd and flex     L shoulder LAD    IASTM w/ Powerboost L1 curved to deltoid    Timed Minutes 11     Therapy Education/Self Care 55834   Education offered today Nature of condition, POC moving forward, taping of L ACJ   Medbride Code     Ongoing HEP   Access Code: [email or web address]  URL: [email or web address]  Date: 07/10/2024  Prepared by: Krystyna Mccormack     Exercises  - Supine Shoulder Flexion AAROM with Hands Clasped  - 1 x daily - 7 x weekly - 3 sets - 10 reps  - Standing Shoulder Abduction AAROM with Dowel  - 1 x daily - 7 x weekly - 3 sets - 10 reps  - Seated Scapular Retraction  - 1 x  daily - 7 x weekly - 3 sets - 10 reps - 5 hold  - Corner Pec Major Stretch  - 1 x daily - 7 x weekly - 3 reps - 30-60 sec hold   Timed Minutes         Total Timed Treatment:     45   mins  Total Time of Visit:             45   mins         ASSESSMENT/PLAN     GOALS  Goals                                                    Progress Note due by 8/9/24                                                                Recert due by 10/7/24   STG by: 4 weeks Comments Date Status   Decrease subjective pain to 2/10         Decreased muscle guarding  throughout shoulder girdle  guarded posteriorly  7/30                         LTG by: 8 weeks         Symmetrical valerie shoulder flexion and abduction to at least 150 deg actively to improve ability to reach into overhead cabinets         Able to resume household and work activities without exacerbation of symptoms         Gross shoulder MMT at least 4+/5 to improve ability to lift items and groceries at home          Understands improved ergonomics for work and HEP for flexibility and stability         Improve QuickDASH score to 20 or less  56.82       Reports no pain except occasional minor twinges no more than 2/10             Assessment/Plan     ASSESSMENT: Pt reported some cont pain when raising his LUE into ABD, noting it does not hurt like it did last visit and feels easier. He noted slight discomfort coming down during banded sh pulses, but after depressing his B scauplae the pain decreased. He reported decreased pain and tightness, with improved overhead mobility following treatment.       PLAN:   Check and see if he has a referral for his R shoulder and add to POC if able. Continue to appropriately progress L UE strength and ROM.     SIGNATURE: Tyrone Acuña PTA, [place] License #: [id number]  Electronically Signed on 8/1/2024        [address] [place] [address]. [address]  [phone]

## 2024-08-06 ENCOUNTER — TREATMENT (OUTPATIENT)
Dept: PHYSICAL THERAPY | Facility: CLINIC | Age: 64
End: 2024-08-06
Payer: OTHER GOVERNMENT

## 2024-08-06 DIAGNOSIS — M25.812 IMPINGEMENT OF LEFT SHOULDER: Primary | ICD-10-CM

## 2024-08-06 DIAGNOSIS — G89.29 CHRONIC LEFT SHOULDER PAIN: ICD-10-CM

## 2024-08-06 DIAGNOSIS — M25.512 CHRONIC LEFT SHOULDER PAIN: ICD-10-CM

## 2024-08-06 PROCEDURE — 97110 THERAPEUTIC EXERCISES: CPT

## 2024-08-06 PROCEDURE — 97140 MANUAL THERAPY 1/> REGIONS: CPT

## 2024-08-06 NOTE — PROGRESS NOTES
"                                                                Physical Therapy Treatment Note  [address] [place] [address]    Patient: Red Carver                                                 Visit Date: 2024  :     1960    Referring practitioner:    AME Wells*  Date of Initial Visit:          Type: THERAPY  Noted: 7/10/2024    Patient seen for 4 sessions    Visit Diagnoses:    ICD-10-CM ICD-9-CM   1. Impingement of left shoulder  M25.812 719.81   2. Chronic left shoulder pain  M25.512 719.41    G89.29 338.29     SUBJECTIVE     Subjective:He reports he really feels like he has made a lot of improvement denies pain at rest only with elevation beyond shoulder high      PAIN: 0/10         OBJECTIVE     Objective     Manual Therapy     57638  Comments   Assessed B lat length x 2    STM L latissimus dorsi with ratchet roller R SL    L humeral inferior glides Grade 2 supine           Timed Minutes 20         Therapeutic Exercises    11085 Units Comments   Supine L serratus punch #2 x 15     Supine \"t's\" with yellow T band x 15     Supine \"y's\" with yellow T band x 15     B pec and thoracic stretches in sitting with 1/2 roller     L tripod row #5 x 15     L isometric ER and IR walkouts with yellow T band x 15          Timed Minutes 23        Therapy Education/Self Care 16584   Education offered today    Medbride Code    Ongoing HEP   Access Code: [email or web address]  URL: [email or web address]  Date: 07/10/2024  Prepared by: Krystyna Mccormack     Exercises  - Supine Shoulder Flexion AAROM with Hands Clasped  - 1 x daily - 7 x weekly - 3 sets - 10 reps  - Standing Shoulder Abduction AAROM with Dowel  - 1 x daily - 7 x weekly - 3 sets - 10 reps  - Seated Scapular Retraction  - 1 x daily - 7 x weekly - 3 sets - 10 reps - 5 hold  - Corner Pec Major Stretch  - 1 x daily - 7 x weekly - 3 reps - 30-60 sec hold   Timed Minutes        Total Timed Treatment:     43   mins  Total Time of Visit:   "           43   mins         ASSESSMENT/PLAN     GOALS  Goals                                                    Progress Note due by 8/9/24                                                                Recert due by 10/7/24   STG by: 4 weeks Comments Date Status   Decrease subjective pain to 2/10  0/10 at rest  8/6  Ongoing   Decreased muscle guarding  throughout shoulder girdle  guarded posteriorly  7/30                         LTG by: 8 weeks         Symmetrical valerie shoulder flexion and abduction to at least 150 deg actively to improve ability to reach into overhead cabinets         Able to resume household and work activities without exacerbation of symptoms         Gross shoulder MMT at least 4+/5 to improve ability to lift items and groceries at home          Understands improved ergonomics for work and HEP for flexibility and stability         Improve QuickDASH score to 20 or less  56.82       Reports no pain except occasional minor twinges no more than 2/10             Assessment/Plan     ASSESSMENT:   He is responding well to PT intervention and fairly rapidly as compared to the clinical prediction guidelines. He does tend to activate his upper trap with shoulder elevation.    PLAN:   Review all goals for a progress note and continue RTC strengthening activities.    SIGNATURE: Alec Bynum PTA [place] License #: [id number]  Electronically Signed on 8/6/2024        [address] [place] [address] [place] [address]  [phone]

## 2024-08-08 ENCOUNTER — TREATMENT (OUTPATIENT)
Dept: PHYSICAL THERAPY | Facility: CLINIC | Age: 64
End: 2024-08-08
Payer: OTHER GOVERNMENT

## 2024-08-08 DIAGNOSIS — M25.812 IMPINGEMENT OF LEFT SHOULDER: Primary | ICD-10-CM

## 2024-08-08 DIAGNOSIS — M25.512 CHRONIC LEFT SHOULDER PAIN: ICD-10-CM

## 2024-08-08 DIAGNOSIS — G89.29 CHRONIC LEFT SHOULDER PAIN: ICD-10-CM

## 2024-08-08 NOTE — PROGRESS NOTES
"                                                                Physical Therapy Treatment Note and 30 Day Progress Note  [address] [place] [address]    Patient: Red Carver                                                 Visit Date: 2024  :     1960    Referring practitioner:    AME Wells*  Date of Initial Visit:          Type: THERAPY  Noted: 7/10/2024    Patient seen for 5 sessions    Visit Diagnoses:    ICD-10-CM ICD-9-CM   1. Impingement of left shoulder  M25.812 719.81   2. Chronic left shoulder pain  M25.512 719.41    G89.29 338.29     SUBJECTIVE     Subjective:He says everyday his shoulder feels better. He said he was interested in trying DN. [facility].     PAIN: 0/10 at rest, 5/10       OBJECTIVE     Objective     Dry Needle Location [ (1-2 muscles)/ (3 or more muscles)]   Location Depth (\") Comment   Left axillary 1    Left suprascap 2    Left post, mid, ant delt 1    Left RC insertion 2                     He immediately improved active left shoulder flexion to 165 deg   Time 20      Modalities Comments    estim 2 channels, delt  Mode: L, freq: 5  Intensity 4       Minutes 15     Manual Therapy     01272  Comments   Percussive IASTM using Powerboost to left post RC and lat at L2 using ball attachment      Left post GH mob    Left flexion MWM    Passive left ER stretch        Timed Minutes 30     Therapy Education/Self Care 72406   Education offered today    Medbride Code    Ongoing HEP   Access Code: [email or web address]  URL: [email or web address]  Date: 07/10/2024  Prepared by: Krystyna Mccormack     Exercises  - Supine Shoulder Flexion AAROM with Hands Clasped  - 1 x daily - 7 x weekly - 3 sets - 10 reps  - Standing Shoulder Abduction AAROM with Dowel  - 1 x daily - 7 x weekly - 3 sets - 10 reps  - Seated Scapular Retraction  - 1 x daily - 7 x weekly - 3 sets - 10 reps - 5 hold  - Corner Pec Major Stretch  - 1 x daily - 7 x weekly - 3 reps - " 30-60 sec hold   Timed Minutes        Total Timed Treatment:     30   mins  Total Time of Visit:             50   mins         ASSESSMENT/PLAN     GOALS  Goals                                                    Progress Note due by 9/7/24                                                                Recert due by 10/7/24   STG by: 4 weeks Comments Date Status   Decrease subjective pain to 2/10  5/10 at with elevation; 0/10 at rest 8/8  Ongoing   Decreased muscle guarding  throughout shoulder girdle  guarded posteriorly  8/8  ongoing                       LTG by: 8 weeks         Symmetrical valerie shoulder flexion and abduction to at least 150 deg actively to improve ability to reach into overhead cabinets R: 162  L: 122 8/8  ongoing   Able to resume household and work activities without exacerbation of symptoms  still some impingement but not as much 8/8 ongoing   Gross shoulder MMT at least 4+/5 to improve ability to lift items and groceries at home   grossly 4/5 all planes 8/8 ongoing   Understands improved ergonomics for work and HEP for flexibility and stability  emphasis on flexibility 8/8 ongoing   Improve QuickDASH score to 20 or less  56.82 at eval  34.09 on 8/8 8/8 progressing   Reports no pain except occasional minor twinges no more than 2/10  5/10 with elevation 8/8 ongoing       Assessment & Plan       Assessment  Impairments: abnormal muscle firing, abnormal or restricted ROM, activity intolerance, impaired physical strength, lacks appropriate home exercise program and pain with function   Functional limitations: carrying objects, lifting, pulling, pushing, uncomfortable because of pain, reaching behind back, reaching overhead and unable to perform repetitive tasks   Prognosis: good    Plan  Therapy options: will be seen for skilled therapy services  Planned modality interventions: cryotherapy, dry needling, low level laser therapy, iontophoresis, TENS, electrical stimulation/Russian stimulation, high  voltage pulsed current (pain management), high voltage pulsed current (spasm management), hydrotherapy and ultrasound  Planned therapy interventions: ADL retraining, body mechanics training, fine motor coordination training, flexibility, functional ROM exercises, home exercise program, IADL retraining, joint mobilization, manual therapy, motor coordination training, neuromuscular re-education, postural training, soft tissue mobilization, strengthening, stretching and therapeutic activities  Frequency: 2x week  Duration in weeks: 8  Treatment plan discussed with: patient         ASSESSMENT:   He responded very well to the DN. He immediately showed improved left shoulder flexion. He is interested in having his right arm stronger from a CVA and I advised he use OT for this.     PLAN:   Assess long term effect of DN and continue with ROM and progressive strengthening.     SIGNATURE: Gregorio Gotti, PT, [place] License #: [id number]  Electronically Signed on 8/8/2024        Markus [place]. [address]  [phone]

## 2024-08-13 ENCOUNTER — TREATMENT (OUTPATIENT)
Dept: PHYSICAL THERAPY | Facility: CLINIC | Age: 64
End: 2024-08-13
Payer: OTHER GOVERNMENT

## 2024-08-13 DIAGNOSIS — G89.29 CHRONIC LEFT SHOULDER PAIN: ICD-10-CM

## 2024-08-13 DIAGNOSIS — M25.512 CHRONIC LEFT SHOULDER PAIN: ICD-10-CM

## 2024-08-13 DIAGNOSIS — M25.812 IMPINGEMENT OF LEFT SHOULDER: Primary | ICD-10-CM

## 2024-08-13 PROCEDURE — 97110 THERAPEUTIC EXERCISES: CPT

## 2024-08-13 PROCEDURE — 97140 MANUAL THERAPY 1/> REGIONS: CPT

## 2024-08-13 NOTE — PROGRESS NOTES
Physical Therapy Treatment Note  [address] [place] [address]    Patient: Red Carver                                                 Visit Date: 2024  :     1960    Referring practitioner:    AME Wells*  Date of Initial Visit:          Type: THERAPY  Noted: 7/10/2024    Patient seen for 6 sessions    Visit Diagnoses:    ICD-10-CM ICD-9-CM   1. Impingement of left shoulder  M25.812 719.81   2. Chronic left shoulder pain  M25.512 719.41    G89.29 338.29     SUBJECTIVE     Subjective:He states his arm is moving better      PAIN: 0/10         OBJECTIVE     Objective     Therapeutic Exercises    23520 Units Comments   UBE seat# 8 res 1 3 min fwd/bwd each     L D2 extension @ Cybex #1 x 15      L D2 flexion @ Cybex #1 x 10     B pec and thoracic stretches in sitting with 1/2 roller      L tripod row #10 x 15      Plantargrade BOSU rocking x 15     L isometric ER and IR walkouts with red  T band x 15           Timed Minutes 25      Manual Therapy     31418  Comments   STM L shoulder globally                     Timed Minutes 15           Therapy Education/Self Care 39029   Education offered today    Medbride Code    Ongoing HEP   Access Code: [email or web address]  URL: [email or web address]  Date: 07/10/2024  Prepared by: Krystyna Mccormack     Exercises  - Supine Shoulder Flexion AAROM with Hands Clasped  - 1 x daily - 7 x weekly - 3 sets - 10 reps  - Standing Shoulder Abduction AAROM with Dowel  - 1 x daily - 7 x weekly - 3 sets - 10 reps  - Seated Scapular Retraction  - 1 x daily - 7 x weekly - 3 sets - 10 reps - 5 hold  - Corner Pec Major Stretch  - 1 x daily - 7 x weekly - 3 reps - 30-60 sec hold   Timed Minutes        Total Timed Treatment:     40   mins  Total Time of Visit:             40   mins         ASSESSMENT/PLAN     GOALS  Goals                                                    Progress Note due by 24                                                                 Recert due by 10/7/24   STG by: 4 weeks Comments Date Status   Decrease subjective pain to 2/10  5/10 at with elevation; 0/10 at rest 8/8  Ongoing   Decreased muscle guarding  throughout shoulder girdle  guarded posteriorly  8/8  ongoing                       LTG by: 8 weeks         Symmetrical valerie shoulder flexion and abduction to at least 150 deg actively to improve ability to reach into overhead cabinets R: 162  L: 122 8/8  ongoing   Able to resume household and work activities without exacerbation of symptoms  still some impingement but not as much 8/8 ongoing   Gross shoulder MMT at least 4+/5 to improve ability to lift items and groceries at home   grossly 4/5 all planes 8/8 ongoing   Understands improved ergonomics for work and HEP for flexibility and stability  emphasis on flexibility 8/8 ongoing   Improve QuickDASH score to 20 or less  56.82 at eval  34.09 on 8/8 8/8 progressing   Reports no pain except occasional minor twinges no more than 2/10  5/10 with elevation 8/8 ongoing       Assessment/Plan     ASSESSMENT:   His previous session was a progress note; therefore, there were no significant changes in regards to his POC goals.    PLAN:   Continue RTC strengthening activities.    SIGNATURE: Alec Bynum PTA, [place] License #: [id number]  Electronically Signed on 8/13/2024        [facility] [address]  [phone]

## 2024-08-15 ENCOUNTER — TREATMENT (OUTPATIENT)
Dept: PHYSICAL THERAPY | Facility: CLINIC | Age: 64
End: 2024-08-15
Payer: OTHER GOVERNMENT

## 2024-08-15 DIAGNOSIS — M25.812 IMPINGEMENT OF LEFT SHOULDER: Primary | ICD-10-CM

## 2024-08-15 DIAGNOSIS — G89.29 CHRONIC LEFT SHOULDER PAIN: ICD-10-CM

## 2024-08-15 DIAGNOSIS — M25.512 CHRONIC LEFT SHOULDER PAIN: ICD-10-CM

## 2024-08-15 NOTE — PROGRESS NOTES
Physical Therapy Treatment Note  [address] [place] [address]    Patient: Red Carver                                                 Visit Date: 8/15/2024  :     1960    Referring practitioner:    AME Wells*  Date of Initial Visit:          Type: THERAPY  Noted: 7/10/2024    Patient seen for 7 sessions    Visit Diagnoses:    ICD-10-CM ICD-9-CM   1. Impingement of left shoulder  M25.812 719.81   2. Chronic left shoulder pain  M25.512 719.41    G89.29 338.29     SUBJECTIVE     Subjective:Every session he feels better no new complaints, denmies pain      PAIN: 0/10         OBJECTIVE     Objective     Therapeutic Exercises    37854 Units Comments   B pec and thoracic stretches in sitting with 1/2 roller      ER/IR with Body Blade x 4 sets     L supine serratus punch #3 x 20     Modified supine Thor's hammer press with red T band x 4 sets          Timed Minutes 30        Manual Therapy     36352  Comments   STM L shoulder globally                              Timed Minutes 10     Therapy Education/Self Care 05158   Education offered today    Medbride Code    Ongoing HEP   Access Code: [email or web address]  URL: [email or web address]  Date: 07/10/2024  Prepared by: Krystyna Mccormack     Exercises  - Supine Shoulder Flexion AAROM with Hands Clasped  - 1 x daily - 7 x weekly - 3 sets - 10 reps  - Standing Shoulder Abduction AAROM with Dowel  - 1 x daily - 7 x weekly - 3 sets - 10 reps  - Seated Scapular Retraction  - 1 x daily - 7 x weekly - 3 sets - 10 reps - 5 hold  - Corner Pec Major Stretch  - 1 x daily - 7 x weekly - 3 reps - 30-60 sec hold   Timed Minutes        Total Timed Treatment:     40   mins  Total Time of Visit:             40   mins         ASSESSMENT/PLAN     GOALS  Goals                                                    Progress Note due by 24                                                                Recert  due by 10/7/24   STG by: 4 weeks Comments Date Status   Decrease subjective pain to 2/10  5/10 at with elevation; 0/10 at rest 8/8  Ongoing   Decreased muscle guarding  throughout shoulder girdle  guarded posteriorly  8/8  ongoing                       LTG by: 8 weeks         Symmetrical valerie shoulder flexion and abduction to at least 150 deg actively to improve ability to reach into overhead cabinets R: 162  L: 122 8/8  ongoing   Able to resume household and work activities without exacerbation of symptoms  still some impingement but not as much 8/8 ongoing   Gross shoulder MMT at least 4+/5 to improve ability to lift items and groceries at home   grossly 4/5 all planes 8/8 ongoing   Understands improved ergonomics for work and HEP for flexibility and stability  emphasis on flexibility 8/8 ongoing   Improve QuickDASH score to 20 or less  56.82 at eval  34.09 on 8/8 8/8 progressing   Reports no pain except occasional minor twinges no more than 2/10  5/10 with elevation 8/8 ongoing       Assessment/Plan     ASSESSMENT:   He is responding well to PT intervention and fairly rapidly as compared to the clinical prediction guidelines. He does tend to activate his upper trap with shoulder elevation.     PLAN:   Continue RTC strengthening activities.     SIGNATURE: Alec Bynum PTA, [place] License #: [id number]  Electronically Signed on 8/15/2024        [address] [place] [address]. [address]  [phone]

## 2024-08-20 ENCOUNTER — TREATMENT (OUTPATIENT)
Dept: PHYSICAL THERAPY | Facility: CLINIC | Age: 64
End: 2024-08-20
Payer: OTHER GOVERNMENT

## 2024-08-20 DIAGNOSIS — M25.812 IMPINGEMENT OF LEFT SHOULDER: Primary | ICD-10-CM

## 2024-08-20 DIAGNOSIS — G89.29 CHRONIC LEFT SHOULDER PAIN: ICD-10-CM

## 2024-08-20 DIAGNOSIS — M25.512 CHRONIC LEFT SHOULDER PAIN: ICD-10-CM

## 2024-08-20 PROCEDURE — 97110 THERAPEUTIC EXERCISES: CPT

## 2024-08-20 PROCEDURE — 97140 MANUAL THERAPY 1/> REGIONS: CPT

## 2024-08-20 NOTE — PROGRESS NOTES
"                                                                Physical Therapy Treatment Note  [address] [place] [address]    Patient: Red Carver                                                 Visit Date: 2024  :     1960    Referring practitioner:    AME Wells*  Date of Initial Visit:          Type: THERAPY  Noted: 7/10/2024    Patient seen for 8 sessions    Visit Diagnoses:    ICD-10-CM ICD-9-CM   1. Impingement of left shoulder  M25.812 719.81   2. Chronic left shoulder pain  M25.512 719.41    G89.29 338.29     SUBJECTIVE     Subjective: [facility] and [facility] for his other shoulder. He is amazed with how much he can raise his L shoulder.     PAIN: 0/10 at rest, \"has a little kink at end range flexion\" 5/10 <      OBJECTIVE     Objective     Therapeutic Exercises    15680 Units Comments   L UE bent over Ts w/ 3 lbs   2x10     L UE bent over Is w/ 3 lbs   2x10    L UE bent over Ys w/ 3 lbs   2x10    L eccentric flexion lowering 2 lbs   1x5    L eccentric abduction lowering 2 lbs   1x5    L eccentric openbook lowering 2 lbs  1x5         Timed Minutes 30      Manual Therapy     38183  Comments   STM L shoulder globally                  Timed Minutes 10     Therapy Education/Self Care 90698   Education offered today    Medbride Code    Ongoing HEP   Access Code: [email or web address]  URL: [email or web address]  Date: 07/10/2024  Prepared by: Krystyna Mccormack     Exercises  - Supine Shoulder Flexion AAROM with Hands Clasped  - 1 x daily - 7 x weekly - 3 sets - 10 reps  - Standing Shoulder Abduction AAROM with Dowel  - 1 x daily - 7 x weekly - 3 sets - 10 reps  - Seated Scapular Retraction  - 1 x daily - 7 x weekly - 3 sets - 10 reps - 5 hold  - Corner Pec Major Stretch  - 1 x daily - 7 x weekly - 3 reps - 30-60 sec hold   Timed Minutes        Total Timed Treatment:     40   mins  Total Time of Visit:             40   mins         ASSESSMENT/PLAN "     GOALS  Goals                                                    Progress Note due by 9/7/24                                                                Recert due by 10/7/24   STG by: 4 weeks Comments Date Status   Decrease subjective pain to 2/10  5/10 at with elevation; 0/10 at rest 8/8  Ongoing   Decreased muscle guarding  throughout shoulder girdle  guarded posteriorly  8/8  ongoing                       LTG by: 8 weeks         Symmetrical valerie shoulder flexion and abduction to at least 150 deg actively to improve ability to reach into overhead cabinets R: 162  L: 122 8/8  ongoing   Able to resume household and work activities without exacerbation of symptoms  still some impingement but not as much 8/8 ongoing   Gross shoulder MMT at least 4+/5 to improve ability to lift items and groceries at home   grossly 4/5 all planes 8/8 ongoing   Understands improved ergonomics for work and HEP for flexibility and stability  emphasis on flexibility 8/8 ongoing   Improve QuickDASH score to 20 or less  56.82 at eval  34.09 on 8/8 8/8 progressing   Reports no pain except occasional minor twinges no more than 2/10  5/10 with elevation 8/8 ongoing     Assessment/Plan     ASSESSMENT:   We progressed L proximal strengthening. He did extremely well and demonstrated a great motivation to challenge his strength. Concluded session with STM to decrease tension. [facility] and we will be looking for opportunities to add this to current POC.      PLAN:   Continue RTC strengthening activities. Add his R shoulder to POC if the opportunity presents itself.      SIGNATURE: Elena Gill PTA, [place] License #: [id number]  Electronically Signed on 8/20/2024        [address] [place] [address]. [address]  [phone]

## 2024-08-22 ENCOUNTER — TREATMENT (OUTPATIENT)
Dept: PHYSICAL THERAPY | Facility: CLINIC | Age: 64
End: 2024-08-22
Payer: OTHER GOVERNMENT

## 2024-08-22 DIAGNOSIS — G89.29 CHRONIC LEFT SHOULDER PAIN: ICD-10-CM

## 2024-08-22 DIAGNOSIS — M25.512 CHRONIC LEFT SHOULDER PAIN: ICD-10-CM

## 2024-08-22 DIAGNOSIS — M25.511 CHRONIC RIGHT SHOULDER PAIN: ICD-10-CM

## 2024-08-22 DIAGNOSIS — M54.12 RADICULOPATHY, CERVICAL: ICD-10-CM

## 2024-08-22 DIAGNOSIS — G89.29 CHRONIC RIGHT SHOULDER PAIN: ICD-10-CM

## 2024-08-22 DIAGNOSIS — M25.812 IMPINGEMENT OF LEFT SHOULDER: Primary | ICD-10-CM

## 2024-08-22 PROCEDURE — 97140 MANUAL THERAPY 1/> REGIONS: CPT | Performed by: PHYSICAL THERAPIST

## 2024-08-22 PROCEDURE — 97110 THERAPEUTIC EXERCISES: CPT | Performed by: PHYSICAL THERAPIST

## 2024-08-22 PROCEDURE — 97164 PT RE-EVAL EST PLAN CARE: CPT | Performed by: PHYSICAL THERAPIST

## 2024-08-22 NOTE — PROGRESS NOTES
"                                                                Physical Therapy Treatment Note, 30 Day Progress Note, and ReEvaluation Note  [address] [place] [address]    Patient: Red Carver                                                 Visit Date: 2024  :     1960    Referring practitioner:    AME Wells*  Date of Initial Visit:          Type: THERAPY  Noted: 7/10/2024    Patient seen for 9 sessions    Visit Diagnoses:    ICD-10-CM ICD-9-CM   1. Impingement of left shoulder  M25.812 719.81   2. Chronic left shoulder pain  M25.512 719.41    G89.29 338.29     SUBJECTIVE     Subjective: Left shoulder has no pain at rest, but it has a slight pain whenever he lifts overhead. The R shoulder has been a main concern, and he has a referral for the R shoulder. Pt reports a stroke years ago, so he has had weakness on the R side, but he has been having radicular symptoms all the way down the R UE.He reports pain with almost all movement with the R UE, and he only gets relief from pain and N&T when he sleeps.     PAIN: L: 0/10 at rest, \"has a little kink at end range flexion\" 4/10 <: R: 8/10     OBJECTIVE     Objective          Palpation     Right   Hypertonic in the latissimus and upper trapezius. Tenderness of the upper trapezius.     Tenderness     Right Shoulder  Tenderness in the bicipital groove, infraspinatus tendon and supraspinatus tendon.     Active Range of Motion   Cervical/Thoracic Spine   Cervical    Flexion: WFL  Extension: 40 degrees   Left rotation: 63 degrees   Right rotation: 58 (Pain on R) degrees with pain    Right Shoulder   Flexion: 120 degrees with pain  Abduction: 110 degrees     Passive Range of Motion     Right Shoulder   Flexion: WFL  Adduction: WFL  External rotation 0°: WFL  External rotation 90°: WFL    Strength/Myotome Testing     Right Shoulder     Planes of Motion   Flexion: 3-   Abduction: 3- (Pain)   External rotation at 0°: 3+ (Pain)   External " rotation at 45°: 4-   External rotation at 90°: 3+ (Pain)     Tests   Cervical     Right   Positive Spurling's sign.          Manual Therapy     34163  Comments   STM R upper trap      Lower cervical and CT junction extension mobs Prone; Gr 2-3 sustained          Timed Minutes 15     Therapeutic Exercises    70516 Units Comments   Assess goals for PN                         Timed Minutes 10        Therapy Education/Self Care 29971   Education offered today Nature of condition for R UE radicular symptoms; Reminder of chin tucks for HEP    Medbride Code    Ongoing HEP   Access Code: [email or web address]  URL: [email or web address]  Date: 07/10/2024  Prepared by: Krystyna Mccormack     Exercises  - Supine Shoulder Flexion AAROM with Hands Clasped  - 1 x daily - 7 x weekly - 3 sets - 10 reps  - Standing Shoulder Abduction AAROM with Dowel  - 1 x daily - 7 x weekly - 3 sets - 10 reps  - Seated Scapular Retraction  - 1 x daily - 7 x weekly - 3 sets - 10 reps - 5 hold  - Corner Pec Major Stretch  - 1 x daily - 7 x weekly - 3 reps - 30-60 sec hold   Timed Minutes        Total Timed Treatment:     25   mins  Total Time of Visit:             45   mins         ASSESSMENT/PLAN     GOALS  Goals                                                    Progress Note due by 9/21/24                                                                Recert due by 11/19/24   STG by: 4 weeks Comments Date Status   Decrease subjective pain to 2/10  4/10 at with elevation; 0/10 at rest 8/22  Progressing   Decreased muscle guarding  throughout shoulder girdle  guarded posteriorly  8/22  ongoing                       LTG by: 8 weeks         Symmetrical valerie shoulder flexion and abduction to at least 150 deg actively to improve ability to reach into overhead cabinets R: 162  L: 122  R: after cervical ext mobs, flexion and abduction were 160 with 3/10 pain 8/22  ongoing   Able to resume household and work activities without exacerbation of symptoms  still some  impingement but not as much 8/22 ongoing   Gross shoulder MMT at least 4+/5 to improve ability to lift items and groceries at home   L: grossly 4/5 all planes    R: IE before treatment: could not full full ROM; after treatment, 4/5 8/22 ongoing   Understands improved ergonomics for work and HEP for flexibility and stability  emphasis on flexibility 8/8 ongoing   Improve QuickDASH score to 20 or less for R   56.82 at eval  34.09 on 8/8 8/8 progressing   Reports no pain except occasional minor twinges no more than 2/10  4/10 with elevation 8/22 ongoing   Reports eliminated R UE radicular symptoms for 1 week  8/22 New   Be independent with HEP for L shoulder and R UE radicular pain  8/22 New   Improve cervical rotation to 65 degrees bilaterally with report of no pain  8/22 New           Assessment & Plan       Assessment  Impairments: abnormal muscle firing, abnormal or restricted ROM, activity intolerance, impaired physical strength, lacks appropriate home exercise program and pain with function   Functional limitations: carrying objects, lifting, pulling, pushing, uncomfortable because of pain, reaching behind back, reaching overhead and unable to perform repetitive tasks   Assessment details: Pt is a 64 y/o male presenting with R shoulder pain, weakness, and AROM deficits. Special testing is consistent with cervical radiculopathy with referred symptoms to the R UE. He experiences significant pain and weakness when trying to reach overhead during flexion and abduction. Passive ROM is within normal limits. His condition prevents him from being able to perform normal ADLs with RUE without increased pain. The Pt would benefit from skilled PT to decrease pain, improve functional mobility, progress toward goals, and increase overall quality of life.    Prognosis: good    Plan  Therapy options: will be seen for skilled therapy services  Planned modality interventions: cryotherapy, dry needling, low level laser therapy,  iontophoresis, TENS, electrical stimulation/Russian stimulation, high voltage pulsed current (pain management), high voltage pulsed current (spasm management), hydrotherapy and ultrasound  Planned therapy interventions: ADL retraining, body mechanics training, fine motor coordination training, flexibility, functional ROM exercises, home exercise program, IADL retraining, joint mobilization, manual therapy, motor coordination training, neuromuscular re-education, postural training, soft tissue mobilization, strengthening, stretching and therapeutic activities  Frequency: 2x week  Duration in weeks: 8  Treatment plan discussed with: patient  Plan details: Work toward decreasing muscle guarding throughout shoulder, and increasing L shoulder strengthening as well. Promote proper scapulothoracic rhythm. Work to improve mobility and strength of cervical and upper thoracic joints and musculature for R UE radicular symptoms.          ASSESSMENT:   Today we performed a re-evaluation to add cervical radiculopathy with R shoulder pain to the POC. The pt responded extremely well to manual therapy of the CT junction. Prior to extension mobs, the pt did not have full active motion of R shoulder flexion and abduction, but following treatment, he was able to achieve full ROM with decreased pain. The pt is motivated to increase his mobility and strength of both UEs. Pt will benefit from continued skilled PT to improve his UE strength, ROM, decrease pain, and improve his quality of life.     PLAN:   Get QuickDASH for goals. Continue RTC strengthening activities. R shoulder was added to his POC, so start working on cervical mobility training and then progress to strengthening and functional lifting activities.     SIGNATURE: Latesha Samuel PT Student, [place] License #:   Electronically Signed on 8/22/2024  The clinical instructor and/or supervising staff, Gregorio Gotti, PT, was present in clinic guiding the visit by approving,  concurring, and confirming the skilled judgement for all services rendered.    Signature:  Gregorio Gotti PT, [place] License #: [id number]  Electronically signed on 8/22/2024      Clinical Progress: improved  Home Program Compliance: Yes  Progress toward previous goals: Partially Met      Reevaluation for new order   Certification Period: 8/22/2024 through 11/19/2024  I certify that the therapy services are furnished while this patient is under my care.  The services outlined above are required by this patient, and will be reviewed every 90 days.     PHYSICIAN: Manuelito Perez PA-C (NPI: [id number])    Signature:___________________________________________DATE: _________    Please sign and return via fax to [phone].   Thank you so much for letting us work with Red. I appreciate your letting us work with your patients. If you have any questions or concerns, please don't hesitate to contact me.              [address] [address]  [phone]

## 2024-08-27 ENCOUNTER — TREATMENT (OUTPATIENT)
Dept: PHYSICAL THERAPY | Facility: HOSPITAL | Age: 64
End: 2024-08-27
Payer: OTHER GOVERNMENT

## 2024-08-27 DIAGNOSIS — M25.511 CHRONIC RIGHT SHOULDER PAIN: ICD-10-CM

## 2024-08-27 DIAGNOSIS — M54.12 RADICULOPATHY, CERVICAL: ICD-10-CM

## 2024-08-27 DIAGNOSIS — G89.29 CHRONIC LEFT SHOULDER PAIN: ICD-10-CM

## 2024-08-27 DIAGNOSIS — M25.512 CHRONIC LEFT SHOULDER PAIN: ICD-10-CM

## 2024-08-27 DIAGNOSIS — G89.29 CHRONIC RIGHT SHOULDER PAIN: ICD-10-CM

## 2024-08-27 DIAGNOSIS — M25.512 LEFT SHOULDER PAIN, UNSPECIFIED CHRONICITY: ICD-10-CM

## 2024-08-27 DIAGNOSIS — M25.812 IMPINGEMENT OF LEFT SHOULDER: Primary | ICD-10-CM

## 2024-08-27 PROCEDURE — 97535 SELF CARE MNGMENT TRAINING: CPT

## 2024-08-27 PROCEDURE — 97140 MANUAL THERAPY 1/> REGIONS: CPT

## 2024-08-27 NOTE — PROGRESS NOTES
"                                                                Physical Therapy Treatment Note  [address] [place] [address]    Patient: Red Carver                                                 Visit Date: 2024  :     1960    Referring practitioner:    AME Wells*  Date of Initial Visit:          Type: THERAPY  Noted: 7/10/2024    Patient seen for 10 sessions    Visit Diagnoses:    ICD-10-CM ICD-9-CM   1. Impingement of left shoulder  M25.812 719.81   2. Left shoulder pain, unspecified chronicity  M25.512 719.41   3. Chronic left shoulder pain  M25.512 719.41    G89.29 338.29   4. Chronic right shoulder pain  M25.511 719.41    G89.29 338.29   5. Radiculopathy, cervical  M54.12 723.4     SUBJECTIVE     Subjective: He has been working on his HEP. R is numb and tight.     PAIN: L: 0/10 at rest, \"has a little kink at end range flexion\" 4/10 <: R: 8/10     OBJECTIVE     Objective      Manual Therapy     45083  Comments   STM R upper trap     IASTM w/ double lacrosse ball to thoracic dain    Prone thoracic/CT PA mobs     Cervical manual distraction w/ bilateral side bends     passive cervical rotation stretches      Timed Minutes 30     Therapy Education/Self Care 56672   Education offered today Reviewed xray results with patient and emphasized the roll of his neck in his UE symptoms    Medbride Code    Ongoing HEP   Access Code: [email or web address]  URL: [email or web address]  Date: 07/10/2024  Prepared by: Krystyna Mccormack     Exercises  - Supine Shoulder Flexion AAROM with Hands Clasped  - 1 x daily - 7 x weekly - 3 sets - 10 reps  - Standing Shoulder Abduction AAROM with Dowel  - 1 x daily - 7 x weekly - 3 sets - 10 reps  - Seated Scapular Retraction  - 1 x daily - 7 x weekly - 3 sets - 10 reps - 5 hold  - Corner Pec Major Stretch  - 1 x daily - 7 x weekly - 3 reps - 30-60 sec hold   Timed Minutes 10       Total Timed Treatment:     40   mins  Total Time of Visit:             40   " mins         ASSESSMENT/PLAN     GOALS  Goals                                                    Progress Note due by 9/21/24                                                                Recert due by 11/19/24   STG by: 4 weeks Comments Date Status   Decrease subjective pain to 2/10  4/10 at with elevation; 0/10 at rest 8/22  Progressing   Decreased muscle guarding  throughout shoulder girdle  guarded posteriorly  8/22  ongoing                       LTG by: 8 weeks         Symmetrical valerie shoulder flexion and abduction to at least 150 deg actively to improve ability to reach into overhead cabinets R: 162  L: 122  R: after cervical ext mobs, flexion and abduction were 160 with 3/10 pain 8/22  ongoing   Able to resume household and work activities without exacerbation of symptoms  still some impingement but not as much 8/22 ongoing   Gross shoulder MMT at least 4+/5 to improve ability to lift items and groceries at home   L: grossly 4/5 all planes    R: IE before treatment: could not full full ROM; after treatment, 4/5 8/22 ongoing   Understands improved ergonomics for work and HEP for flexibility and stability  emphasis on flexibility 8/8 ongoing   Improve QuickDASH score to 20 or less for R   56.82 at eval  34.09 on 8/8 8/8 progressing   Reports no pain except occasional minor twinges no more than 2/10  4/10 with elevation 8/22 ongoing   Reports eliminated R UE radicular symptoms for 1 week  8/22 New   Be independent with HEP for L shoulder and R UE radicular pain  8/22 New   Improve cervical rotation to 65 degrees bilaterally with report of no pain  8/22 New           Assessment/Plan     ASSESSMENT:   He presents with increased guarding along his R UT and mid traps. We focused primarily on decreasing soft tissue restrictions and improving mobility in his neck. He expressed interest in dry needling.     PLAN:   Get QuickDASH for goals. Perform dry needling if available. Continue RTC strengthening activities.  Decreased guarding in his R UT.     Signature:  Elena Gill PTA, [place] License #: [id number]  Electronically signed on 8/27/2024          [address]  [place] [address]  [phone]

## 2024-08-29 ENCOUNTER — HOSPITAL ENCOUNTER (OUTPATIENT)
Dept: PHYSICAL THERAPY | Facility: HOSPITAL | Age: 64
Setting detail: THERAPIES SERIES
Discharge: HOME | End: 2024-08-29
Payer: OTHER GOVERNMENT

## 2024-08-29 DIAGNOSIS — G89.29 CHRONIC RIGHT SHOULDER PAIN: ICD-10-CM

## 2024-08-29 DIAGNOSIS — M25.512 CHRONIC LEFT SHOULDER PAIN: ICD-10-CM

## 2024-08-29 DIAGNOSIS — M25.512 LEFT SHOULDER PAIN, UNSPECIFIED CHRONICITY: Primary | ICD-10-CM

## 2024-08-29 DIAGNOSIS — M25.511 CHRONIC RIGHT SHOULDER PAIN: ICD-10-CM

## 2024-08-29 DIAGNOSIS — M25.812 IMPINGEMENT OF LEFT SHOULDER: ICD-10-CM

## 2024-08-29 DIAGNOSIS — M54.12 RADICULOPATHY, CERVICAL: ICD-10-CM

## 2024-08-29 DIAGNOSIS — G89.29 CHRONIC LEFT SHOULDER PAIN: ICD-10-CM

## 2024-08-29 PROCEDURE — 97140 MANUAL THERAPY 1/> REGIONS: CPT

## 2024-08-29 NOTE — THERAPY TREATMENT NOTE
Physical Therapy Treatment Note  [address] [place] [address]    Patient: Red Carver                                                 Visit Date: 2024  :     1960    Referring practitioner:    AME Wells*  Date of Initial Visit:          Type: THERAPY  Noted: 7/10/2024      Visit Diagnoses:    ICD-10-CM ICD-9-CM   1. Left shoulder pain, unspecified chronicity  M25.512 719.41   2. Impingement of left shoulder  M25.812 719.81   3. Chronic left shoulder pain  M25.512 719.41    G89.29 338.29   4. Chronic right shoulder pain  M25.511 719.41    G89.29 338.29   5. Radiculopathy, cervical  M54.12 723.4     SUBJECTIVE     Subjective: States he still has some tightness in his neck with some pain into his R shoulder when he turns his head. Notes when he raises his arms is when he hurts, and has some numbness in his R arm.     PAIN: 0/10     OBJECTIVE     Objective     Manual Therapy     32078  Comments   B UT/LS stretches    Cx distraction    Suboccipital release    STM to B UT/LS/Cx dain    TPR to B UT/LS    RUE nerve glides    IASTM w/ Powerboost L1 ball to B UT/LS    Timed Minutes 40      Therapy Education/Self Care 09312   Education offered today Reviewed xray results with patient and emphasized the roll of his neck in his UE symptoms    Medxin Code     Ongoing HEP   Access Code: [email or web address]  URL: [email or web address]  Date: 07/10/2024  Prepared by: Krystyna Mccormack     Exercises  - Supine Shoulder Flexion AAROM with Hands Clasped  - 1 x daily - 7 x weekly - 3 sets - 10 reps  - Standing Shoulder Abduction AAROM with Dowel  - 1 x daily - 7 x weekly - 3 sets - 10 reps  - Seated Scapular Retraction  - 1 x daily - 7 x weekly - 3 sets - 10 reps - 5 hold  - Corner Pec Major Stretch  - 1 x daily - 7 x weekly - 3 reps - 30-60 sec hold   Timed Minutes        Total Timed Treatment:     40   mins  Total Time of Visit:              40   mins         ASSESSMENT/PLAN     GOALS  Goals                                                    Progress Note due by 9/21/24                                                                Recert due by 11/19/24   STG by: 4 weeks Comments Date Status   Decrease subjective pain to 2/10  4/10 at with elevation; 0/10 at rest 8/22  Progressing   Decreased muscle guarding  throughout shoulder girdle  guarded posteriorly  8/22  ongoing                       LTG by: 8 weeks         Symmetrical valerie shoulder flexion and abduction to at least 150 deg actively to improve ability to reach into overhead cabinets R: 162  L: 122  R: after cervical ext mobs, flexion and abduction were 160 with 3/10 pain 8/22  ongoing   Able to resume household and work activities without exacerbation of symptoms  still some impingement but not as much 8/22 ongoing   Gross shoulder MMT at least 4+/5 to improve ability to lift items and groceries at home   L: grossly 4/5 all planes     R: IE before treatment: could not full full ROM; after treatment, 4/5 8/22 ongoing   Understands improved ergonomics for work and HEP for flexibility and stability  emphasis on flexibility 8/8 ongoing   Improve QuickDASH score to 20 or less for R   56.82 at eval  34.09 on 8/8 8/8 progressing   Reports no pain except occasional minor twinges no more than 2/10  4/10 with elevation 8/22 ongoing   Reports eliminated R UE radicular symptoms for 1 week   8/22 New   Be independent with HEP for L shoulder and R UE radicular pain   8/22 New   Improve cervical rotation to 65 degrees bilaterally with report of no pain   8/22 New                 Assessment/Plan     ASSESSMENT:   Pt reported cont Cx tightness, noting his shoulders feel fine until he raises them out to the side and then he will have a pinch like pain. He presented with increased tightness and TP activity in his B UT/LS R>L. He noted some pulling in his RUE during ulnar nerve glides, but the more he  performed the less pulling he felt. Following treatment he was able to raises hi RUE overhead into ABD with no issues.     PLAN:   Get QuickDASH for goals. Perform dry needling if available. Continue RTC strengthening activities. Decreased guarding in his R UT.     SIGNATURE: Tyrone Acuña PTA, [place] License #: [id number]  Electronically Signed on 8/29/2024        [address] [place] [address]. [address]  [phone]

## 2024-09-03 ENCOUNTER — HOSPITAL ENCOUNTER (OUTPATIENT)
Dept: PHYSICAL THERAPY | Facility: HOSPITAL | Age: 64
Setting detail: THERAPIES SERIES
Discharge: HOME | End: 2024-09-03
Payer: OTHER GOVERNMENT

## 2024-09-03 DIAGNOSIS — G89.29 CHRONIC LEFT SHOULDER PAIN: ICD-10-CM

## 2024-09-03 DIAGNOSIS — M25.812 IMPINGEMENT OF LEFT SHOULDER: ICD-10-CM

## 2024-09-03 DIAGNOSIS — M25.512 CHRONIC LEFT SHOULDER PAIN: ICD-10-CM

## 2024-09-03 DIAGNOSIS — M25.511 CHRONIC RIGHT SHOULDER PAIN: ICD-10-CM

## 2024-09-03 DIAGNOSIS — G89.29 CHRONIC RIGHT SHOULDER PAIN: ICD-10-CM

## 2024-09-03 DIAGNOSIS — M25.512 LEFT SHOULDER PAIN, UNSPECIFIED CHRONICITY: Primary | ICD-10-CM

## 2024-09-03 DIAGNOSIS — M54.12 RADICULOPATHY, CERVICAL: ICD-10-CM

## 2024-09-03 PROCEDURE — 97140 MANUAL THERAPY 1/> REGIONS: CPT

## 2024-09-03 PROCEDURE — 97110 THERAPEUTIC EXERCISES: CPT

## 2024-09-03 NOTE — THERAPY TREATMENT NOTE
Physical Therapy Treatment Note  [address] [place] [address]    Patient: Red Carver                                                 Visit Date: 9/3/2024  :     1960    Referring practitioner:    AME Wells*  Date of Initial Visit:          Type: THERAPY  Noted: 7/10/2024      Visit Diagnoses:    ICD-10-CM ICD-9-CM   1. Left shoulder pain, unspecified chronicity  M25.512 719.41   2. Impingement of left shoulder  M25.812 719.81   3. Chronic left shoulder pain  M25.512 719.41    G89.29 338.29   4. Chronic right shoulder pain  M25.511 719.41    G89.29 338.29   5. Radiculopathy, cervical  M54.12 723.4     SUBJECTIVE     Subjective: He still feels the same tightness in his R shoulder/arm.     PAIN: 0/10     OBJECTIVE     Objective     Therapeutic Exercises    90055 Units Comments   Obtained updated QuickDASH     Bolstered, printed, and reviewed HEP                     Timed Minutes 15      Manual Therapy     45171  Comments   STM B upper trap (R>L)    Prone thoracic/CT PA mobs     Cervical manual distraction w/ bilateral side bends    passive cervical rotation stretches             Timed Minutes 26      Therapy Education/Self Care [id number]   Education offered today Reviewed xray results with patient and emphasized the roll of his neck in his UE symptoms    Medxin Code     Ongoing HEP   Access Code: [email or web address]  URL: [email or web address]  Date: 2024  Prepared by: Elena Gill    Exercises  - Supine Shoulder Flexion AAROM with Hands Clasped  - 1 x daily - 7 x weekly - 3 sets - 10 reps  - Standing Shoulder Abduction AAROM with Dowel  - 1 x daily - 7 x weekly - 3 sets - 10 reps  - Seated Scapular Retraction  - 1 x daily - 7 x weekly - 3 sets - 10 reps - 5 hold  - Corner Pec Major Stretch  - 1 x daily - 7 x weekly - 3 reps - 30-60 sec hold  - Seated Shoulder Flexion with Resistance  - 1 x daily - 7 x weekly - 2  sets - 10 reps  - Seated Shoulder Abduction with Resistance  - 1 x daily - 7 x weekly - 2 sets - 10 reps  - Seated Single Arm Shoulder External Rotation with Self-Anchored Resistance  - 1 x daily - 7 x weekly - 2 sets - 10 reps   Timed Minutes        Total Timed Treatment:     41   mins  Total Time of Visit:             41   mins         ASSESSMENT/PLAN     GOALS  Goals                                                    Progress Note due by 9/21/24                                                                Recert due by 11/19/24   STG by: 4 weeks Comments Date Status   Decrease subjective pain to 2/10  4/10 at with elevation; 0/10 at rest 8/22  Progressing   Decreased muscle guarding  throughout shoulder girdle  guarded posteriorly  8/22  ongoing                       LTG by: 8 weeks         Symmetrical valerie shoulder flexion and abduction to at least 150 deg actively to improve ability to reach into overhead cabinets R: 162  L: 122  R: after cervical ext mobs, flexion and abduction were 160 with 3/10 pain 8/22  ongoing   Able to resume household and work activities without exacerbation of symptoms  still some impingement but not as much 8/22 ongoing   Gross shoulder MMT at least 4+/5 to improve ability to lift items and groceries at home   L: grossly 4/5 all planes     R: IE before treatment: could not full full ROM; after treatment, 4/5 8/22 ongoing   Understands improved ergonomics for work and HEP for flexibility and stability  emphasis on flexibility 8/8 ongoing   Improve QuickDASH score to 20 or less for R   56.82 at eval   34.09 on 8/8   52.27 on 9/3   8/8 progressing   Reports no pain except occasional minor twinges no more than 2/10  4/10 with elevation 8/22 ongoing   Reports eliminated R UE radicular symptoms for 1 week   8/22 New   Be independent with HEP for L shoulder and R UE radicular pain   8/22 New   Improve cervical rotation to 65 degrees bilaterally with report of no pain   8/22 New                  Assessment/Plan     ASSESSMENT:   His HEP was updated since he hasn't gotten any new exercises since 7/10. He demonstrated understanding. Focused session on decreasing guarding, which has improved.     PLAN:   Perform dry needling if available. Continue RTC strengthening activities. Decreased guarding in his R UT.     SIGNATURE: Elena Gill PTA, [place] License #: [id number]  Electronically Signed on 9/3/2024        [address] [place] [address] [place] [address]  [phone]

## 2024-09-05 ENCOUNTER — HOSPITAL ENCOUNTER (OUTPATIENT)
Dept: PHYSICAL THERAPY | Facility: HOSPITAL | Age: 64
Setting detail: THERAPIES SERIES
Discharge: HOME | End: 2024-09-05
Payer: OTHER GOVERNMENT

## 2024-09-05 DIAGNOSIS — M25.512 CHRONIC LEFT SHOULDER PAIN: ICD-10-CM

## 2024-09-05 DIAGNOSIS — G89.29 CHRONIC LEFT SHOULDER PAIN: ICD-10-CM

## 2024-09-05 DIAGNOSIS — M54.12 RADICULOPATHY, CERVICAL: ICD-10-CM

## 2024-09-05 DIAGNOSIS — M25.511 CHRONIC RIGHT SHOULDER PAIN: ICD-10-CM

## 2024-09-05 DIAGNOSIS — G89.29 CHRONIC RIGHT SHOULDER PAIN: ICD-10-CM

## 2024-09-05 DIAGNOSIS — M25.512 LEFT SHOULDER PAIN, UNSPECIFIED CHRONICITY: Primary | ICD-10-CM

## 2024-09-05 DIAGNOSIS — M25.812 IMPINGEMENT OF LEFT SHOULDER: ICD-10-CM

## 2024-09-05 PROCEDURE — 97140 MANUAL THERAPY 1/> REGIONS: CPT

## 2024-09-05 NOTE — THERAPY TREATMENT NOTE
Physical Therapy Treatment Note  [address] [place] [address]    Patient: Red Carver                                                 Visit Date: 2024  :     1960    Referring practitioner:    AME Wells*  Date of Initial Visit:          Type: THERAPY  Noted: 7/10/2024      Visit Diagnoses:    ICD-10-CM ICD-9-CM   1. Left shoulder pain, unspecified chronicity  M25.512 719.41   2. Impingement of left shoulder  M25.812 719.81   3. Chronic left shoulder pain  M25.512 719.41    G89.29 338.29   4. Chronic right shoulder pain  M25.511 719.41    G89.29 338.29   5. Radiculopathy, cervical  M54.12 723.4     SUBJECTIVE     Subjective: He still feels the same tightness in his R shoulder/arm.     PAIN: 5/10 L , 7/10R     OBJECTIVE     Objective        Manual Therapy     75114  Comments   STM B upper trap (R>L)    Prone thoracic/CT PA mobs     Cervical manual distraction w/ bilateral side bends    passive cervical rotation stretches     B GH PA mobs w/ intermittent LAD    B passive shoulder PROM into flexion/abd/ER/IR     Timed Minutes 43      Therapy Education/Self Care 21497   Education offered today Reviewed xray results with patient and emphasized the roll of his neck in his UE symptoms    Medbride Code     Ongoing HEP   Access Code: [email or web address]  URL: [email or web address]  Date: 2024  Prepared by: Elena Gill    Exercises  - Supine Shoulder Flexion AAROM with Hands Clasped  - 1 x daily - 7 x weekly - 3 sets - 10 reps  - Standing Shoulder Abduction AAROM with Dowel  - 1 x daily - 7 x weekly - 3 sets - 10 reps  - Seated Scapular Retraction  - 1 x daily - 7 x weekly - 3 sets - 10 reps - 5 hold  - Corner Pec Major Stretch  - 1 x daily - 7 x weekly - 3 reps - 30-60 sec hold  - Seated Shoulder Flexion with Resistance  - 1 x daily - 7 x weekly - 2 sets - 10 reps  - Seated Shoulder Abduction with Resistance  - 1  x daily - 7 x weekly - 2 sets - 10 reps  - Seated Single Arm Shoulder External Rotation with Self-Anchored Resistance  - 1 x daily - 7 x weekly - 2 sets - 10 reps   Timed Minutes        Total Timed Treatment:     43   mins  Total Time of Visit:             43   mins         ASSESSMENT/PLAN     GOALS  Goals                                                    Progress Note due by 9/21/24                                                                Recert due by 11/19/24   STG by: 4 weeks Comments Date Status   Decrease subjective pain to 2/10  4/10 at with elevation; 0/10 at rest 8/22  Progressing   Decreased muscle guarding  throughout shoulder girdle  guarded posteriorly  8/22  ongoing                       LTG by: 8 weeks         Symmetrical valerie shoulder flexion and abduction to at least 150 deg actively to improve ability to reach into overhead cabinets R: 162  L: 122  R: after cervical ext mobs, flexion and abduction were 160 with 3/10 pain 8/22  ongoing   Able to resume household and work activities without exacerbation of symptoms  still some impingement but not as much 8/22 ongoing   Gross shoulder MMT at least 4+/5 to improve ability to lift items and groceries at home   L: grossly 4/5 all planes     R: IE before treatment: could not full full ROM; after treatment, 4/5 8/22 ongoing   Understands improved ergonomics for work and HEP for flexibility and stability  emphasis on flexibility 8/8 ongoing   Improve QuickDASH score to 20 or less for R   56.82 at eval   34.09 on 8/8   52.27 on 9/3   8/8 progressing   Reports no pain except occasional minor twinges no more than 2/10  4/10 with elevation 8/22 ongoing   Reports eliminated R UE radicular symptoms for 1 week   8/22 New   Be independent with HEP for L shoulder and R UE radicular pain   8/22 New   Improve cervical rotation to 65 degrees bilaterally with report of no pain   8/22 New                 Assessment/Plan     ASSESSMENT:   He is doing well with his  recently bolstered HEP. Therefore we prioritized thoracic and cervical mobility. He displayed increased guarding bilaterally but moreso on his R side.     PLAN:   Continue RTC strengthening activities. Decreased guarding in his R UT. Gently progress R UE strengthening.     SIGNATURE: Elena Gill PTA, [place] License #: [id number]  Electronically Signed on 9/5/2024        [address] [place] [address]  [phone]

## 2024-09-10 ENCOUNTER — HOSPITAL ENCOUNTER (OUTPATIENT)
Dept: PHYSICAL THERAPY | Facility: HOSPITAL | Age: 64
Setting detail: THERAPIES SERIES
Discharge: HOME | End: 2024-09-10
Payer: OTHER GOVERNMENT

## 2024-09-10 DIAGNOSIS — G89.29 CHRONIC RIGHT SHOULDER PAIN: ICD-10-CM

## 2024-09-10 DIAGNOSIS — M25.512 CHRONIC LEFT SHOULDER PAIN: ICD-10-CM

## 2024-09-10 DIAGNOSIS — G89.29 CHRONIC LEFT SHOULDER PAIN: ICD-10-CM

## 2024-09-10 DIAGNOSIS — M54.12 RADICULOPATHY, CERVICAL: ICD-10-CM

## 2024-09-10 DIAGNOSIS — M25.812 IMPINGEMENT OF LEFT SHOULDER: Primary | ICD-10-CM

## 2024-09-10 DIAGNOSIS — M25.511 CHRONIC RIGHT SHOULDER PAIN: ICD-10-CM

## 2024-09-10 PROCEDURE — 97140 MANUAL THERAPY 1/> REGIONS: CPT

## 2024-09-10 PROCEDURE — 97032 APPL MODALITY 1+ESTIM EA 15: CPT

## 2024-09-10 NOTE — THERAPY TREATMENT NOTE
Physical Therapy Treatment Note  [address], [place] [address]    Patient: Red Carver                                                 Visit Date: 9/10/2024  :     1960    Referring practitioner:    AME Wells*  Date of Initial Visit:          Type: THERAPY  Episode: L Shoulder impingement; R Shoulder Pain; R Cx radiculopathy      Visit Diagnoses:    ICD-10-CM ICD-9-CM   1. Impingement of left shoulder  M25.812 719.81   2. Chronic left shoulder pain  M25.512 719.41    G89.29 338.29   3. Chronic right shoulder pain  M25.511 719.41    G89.29 338.29   4. Radiculopathy, cervical  M54.12 723.4     SUBJECTIVE     Subjective: He's doing good. He's doing his exercises, and he can tell that he is getting stronger and his muscles are getting firmer. His left arm is getting much stronger, and he can tell that his right arm is improving as well.       PAIN: 4/10 L , 7/10 R     OBJECTIVE     Objective     Manual Therapy     40748  Comments   RUE scratch collapse testing  Positive at R pronator teres, scalenes    HL cervical LAD    Cervical manual distraction w/ bilateral side bends + side glides    STM R upper trap, scalenes, LS    IASTM to R pronartor teres region with edge tool        Timed Minutes 30        Modalities Comments   Cold laser/Estim Combo treatment using CIM setting  to R scalebrady     Tx to Date: 1   Minutes 10        Therapy Education/Self Care 03633   Education offered today Reviewed xray results with patient and emphasized the roll of his neck in his UE symptoms    Halle Code     Ongoing HEP   Access Code: [email or web address]  URL: [email or web address]  Date: 2024  Prepared by: Elena Gill    Exercises  - Supine Shoulder Flexion AAROM with Hands Clasped  - 1 x daily - 7 x weekly - 3 sets - 10 reps  - Standing Shoulder Abduction AAROM with Dowel  - 1 x daily - 7 x weekly - 3 sets - 10 reps  - Seated  Scapular Retraction  - 1 x daily - 7 x weekly - 3 sets - 10 reps - 5 hold  - Corner Pec Major Stretch  - 1 x daily - 7 x weekly - 3 reps - 30-60 sec hold  - Seated Shoulder Flexion with Resistance  - 1 x daily - 7 x weekly - 2 sets - 10 reps  - Seated Shoulder Abduction with Resistance  - 1 x daily - 7 x weekly - 2 sets - 10 reps  - Seated Single Arm Shoulder External Rotation with Self-Anchored Resistance  - 1 x daily - 7 x weekly - 2 sets - 10 reps   Timed Minutes        Total Timed Treatment:     40   mins  Total Time of Visit:             40   mins         ASSESSMENT/PLAN     GOALS  Goals                                                    Progress Note due by 9/21/24                                                                Recert due by 11/19/24   STG by: 4 weeks Comments Date Status   Decrease subjective pain to 2/10  4/10 at with elevation; 0/10 at rest 8/22  Progressing   Decreased muscle guarding  throughout shoulder girdle  guarded posteriorly  8/22  ongoing                       LTG by: 8 weeks         Symmetrical valerie shoulder flexion and abduction to at least 150 deg actively to improve ability to reach into overhead cabinets R: 162  L: 122  R: after cervical ext mobs, flexion and abduction were 160 with 3/10 pain 8/22  ongoing   Able to resume household and work activities without exacerbation of symptoms  still some impingement but not as much 8/22 ongoing   Gross shoulder MMT at least 4+/5 to improve ability to lift items and groceries at home   L: grossly 4/5 all planes     R: IE before treatment: could not full full ROM; after treatment, 4/5 8/22 ongoing   Understands improved ergonomics for work and HEP for flexibility and stability  emphasis on flexibility 8/8 ongoing   Improve QuickDASH score to 20 or less for R   56.82 at eval   34.09 on 8/8   52.27 on 9/3   8/8 progressing   Reports no pain except occasional minor twinges no more than 2/10  4/10 with elevation 8/22 ongoing   Reports  eliminated R UE radicular symptoms for 1 week  Cont radicular symptoms into RUE 9/10 Ongoing   Be independent with HEP for L shoulder and R UE radicular pain   8/22 New   Improve cervical rotation to 65 degrees bilaterally with report of no pain   8/22 New                 Assessment/Plan     ASSESSMENT: Pt continues to be faithful with HEP, and is improving. Prioritized manual techniques to supplement this today. Assessed scratch collapse today, and noted compromise at R pronator teres and scalenes.       PLAN:   Continue RTC strengthening activities. Decreased guarding in his R UT. Gently progress R UE strengthening.     SIGNATURE: Krystyna Mccormack PT DPT, [place] License #: [id number]    Electronically Signed on 9/10/2024        [address] [facility] [place] [address]  [phone]

## 2024-09-12 ENCOUNTER — HOSPITAL ENCOUNTER (OUTPATIENT)
Dept: PHYSICAL THERAPY | Facility: HOSPITAL | Age: 64
Setting detail: THERAPIES SERIES
Discharge: HOME | End: 2024-09-12
Payer: OTHER GOVERNMENT

## 2024-09-12 DIAGNOSIS — G89.29 CHRONIC RIGHT SHOULDER PAIN: ICD-10-CM

## 2024-09-12 DIAGNOSIS — M25.812 IMPINGEMENT OF LEFT SHOULDER: Primary | ICD-10-CM

## 2024-09-12 DIAGNOSIS — M54.12 RADICULOPATHY, CERVICAL: ICD-10-CM

## 2024-09-12 DIAGNOSIS — M25.511 CHRONIC RIGHT SHOULDER PAIN: ICD-10-CM

## 2024-09-12 DIAGNOSIS — M25.512 CHRONIC LEFT SHOULDER PAIN: ICD-10-CM

## 2024-09-12 DIAGNOSIS — G89.29 CHRONIC LEFT SHOULDER PAIN: ICD-10-CM

## 2024-09-12 PROCEDURE — 97140 MANUAL THERAPY 1/> REGIONS: CPT

## 2024-09-12 PROCEDURE — 97110 THERAPEUTIC EXERCISES: CPT

## 2024-09-12 NOTE — THERAPY TREATMENT NOTE
Physical Therapy Treatment Note  [address] [place] [address]    Patient: Red Carver                                                 Visit Date: 2024  :     1960    Referring practitioner:    AME Wells*  Date of Initial Visit:          Type: THERAPY  Episode: L Shoulder impingement; R Shoulder Pain; R Cx radiculopathy      Visit Diagnoses:    ICD-10-CM ICD-9-CM   1. Impingement of left shoulder  M25.812 719.81   2. Chronic left shoulder pain  M25.512 719.41    G89.29 338.29   3. Chronic right shoulder pain  M25.511 719.41    G89.29 338.29   4. Radiculopathy, cervical  M54.12 723.4     SUBJECTIVE     Subjective: He feels like every time he comes in here, we work magic on him. He feels like his right side is especially stronger now! He could tell a difference after the scraping to the R forearm as well      PAIN: 4/10 L , 6/10 R     OBJECTIVE     Objective     Therapeutic Exercises    27733 Units Comments   Prone over ball ITYW 2x10 ea Cues to be mindful of neck positioning    Prone over ball chin tucks  10x5' ea    SA wall walks  X10 trips  Doubled YTB    B 90 deg horizontal abd small arm circles thumbs up, thumbs down  CW/CCW 2x30' ea          Timed Minutes 26     Manual Therapy     48575  Comments   manual cervical LAD    STM B upper trap, R scalenes, LS + release    L subscap release Relieved pain with PROM    B GHJ LAD with PROM  When RUE was fully straightened, R hand/arm began involuntarily twitching        Timed Minutes 15       Therapy Education/Self Care 16836   Education offered today Reviewed xray results with patient and emphasized the roll of his neck in his UE symptoms    Medbride Code     Ongoing HEP   Access Code: [email or web address]  URL: [email or web address]  Date: 2024  Prepared by: Elena Gill    Exercises  - Supine Shoulder Flexion AAROM with Hands Clasped  - 1 x daily - 7 x weekly  - 3 sets - 10 reps  - Standing Shoulder Abduction AAROM with Dowel  - 1 x daily - 7 x weekly - 3 sets - 10 reps  - Seated Scapular Retraction  - 1 x daily - 7 x weekly - 3 sets - 10 reps - 5 hold  - Corner Pec Major Stretch  - 1 x daily - 7 x weekly - 3 reps - 30-60 sec hold  - Seated Shoulder Flexion with Resistance  - 1 x daily - 7 x weekly - 2 sets - 10 reps  - Seated Shoulder Abduction with Resistance  - 1 x daily - 7 x weekly - 2 sets - 10 reps  - Seated Single Arm Shoulder External Rotation with Self-Anchored Resistance  - 1 x daily - 7 x weekly - 2 sets - 10 reps   Timed Minutes        Total Timed Treatment:     41   mins  Total Time of Visit:             41   mins         ASSESSMENT/PLAN     GOALS  Goals                                                    Progress Note due by 9/21/24                                                                Recert due by 11/19/24   STG by: 4 weeks Comments Date Status   Decrease subjective pain to 2/10  4/10 at with elevation; 0/10 at rest 8/22  Progressing   Decreased muscle guarding  throughout shoulder girdle  guarded posteriorly  8/22  ongoing                       LTG by: 8 weeks         Symmetrical valerie shoulder flexion and abduction to at least 150 deg actively to improve ability to reach into overhead cabinets R: 162  L: 122  R: after cervical ext mobs, flexion and abduction were 160 with 3/10 pain 8/22  ongoing   Able to resume household and work activities without exacerbation of symptoms  still some impingement but not as much 8/22 ongoing   Gross shoulder MMT at least 4+/5 to improve ability to lift items and groceries at home   L: grossly 4/5 all planes     R: IE before treatment: could not full full ROM; after treatment, 4/5 8/22 ongoing   Understands improved ergonomics for work and HEP for flexibility and stability  emphasis on flexibility 8/8 ongoing   Improve QuickDASH score to 20 or less for R   56.82 at eval   34.09 on 8/8   52.27 on 9/3   8/8  progressing   Reports no pain except occasional minor twinges no more than 2/10  4/10 with elevation 8/22 ongoing   Reports eliminated R UE radicular symptoms for 1 week  Cont radicular symptoms into RUE 9/10 Ongoing   Be independent with HEP for L shoulder and R UE radicular pain   8/22 New   Improve cervical rotation to 65 degrees bilaterally with report of no pain   8/22 New                 Assessment/Plan     ASSESSMENT: Progressed postural strengthening today, in addition to B shoulder AROM. He responded well to this overall, and continues to improve. Concluded session with manual techniques for cervical and shoulder mobility/decreasing impingement. JING responded especially well to subscap release.         PLAN:   Continue RTC strengthening activities. Decreased guarding in his R UT. Gently progress R UE strengthening. Continue with L subscap release     SIGNATURE: Krystyna Mccormack PT DPT, [place] License #: [id number]    Electronically Signed on 9/12/2024        [address] [place] [address]. [address]  [phone]

## 2024-09-20 ENCOUNTER — HOSPITAL ENCOUNTER (OUTPATIENT)
Dept: PHYSICAL THERAPY | Facility: HOSPITAL | Age: 64
Setting detail: THERAPIES SERIES
Discharge: HOME | End: 2024-09-20
Payer: OTHER GOVERNMENT

## 2024-09-20 DIAGNOSIS — M54.12 RADICULOPATHY, CERVICAL: ICD-10-CM

## 2024-09-20 DIAGNOSIS — G89.29 CHRONIC LEFT SHOULDER PAIN: ICD-10-CM

## 2024-09-20 DIAGNOSIS — G89.29 CHRONIC RIGHT SHOULDER PAIN: ICD-10-CM

## 2024-09-20 DIAGNOSIS — M25.511 CHRONIC RIGHT SHOULDER PAIN: ICD-10-CM

## 2024-09-20 DIAGNOSIS — M25.812 IMPINGEMENT OF LEFT SHOULDER: Primary | ICD-10-CM

## 2024-09-20 DIAGNOSIS — M25.512 LEFT SHOULDER PAIN, UNSPECIFIED CHRONICITY: ICD-10-CM

## 2024-09-20 DIAGNOSIS — M25.512 CHRONIC LEFT SHOULDER PAIN: ICD-10-CM

## 2024-09-20 PROCEDURE — 97110 THERAPEUTIC EXERCISES: CPT

## 2024-09-20 PROCEDURE — 97140 MANUAL THERAPY 1/> REGIONS: CPT

## 2024-09-24 ENCOUNTER — HOSPITAL ENCOUNTER (OUTPATIENT)
Dept: PHYSICAL THERAPY | Facility: HOSPITAL | Age: 64
Setting detail: THERAPIES SERIES
Discharge: HOME | End: 2024-09-24
Payer: OTHER GOVERNMENT

## 2024-09-24 DIAGNOSIS — G89.29 CHRONIC RIGHT SHOULDER PAIN: ICD-10-CM

## 2024-09-24 DIAGNOSIS — M54.12 RADICULOPATHY, CERVICAL: ICD-10-CM

## 2024-09-24 DIAGNOSIS — G89.29 CHRONIC LEFT SHOULDER PAIN: ICD-10-CM

## 2024-09-24 DIAGNOSIS — M25.812 IMPINGEMENT OF LEFT SHOULDER: Primary | ICD-10-CM

## 2024-09-24 DIAGNOSIS — M25.512 CHRONIC LEFT SHOULDER PAIN: ICD-10-CM

## 2024-09-24 DIAGNOSIS — M25.511 CHRONIC RIGHT SHOULDER PAIN: ICD-10-CM

## 2024-09-24 PROCEDURE — 97110 THERAPEUTIC EXERCISES: CPT

## 2024-09-24 PROCEDURE — 97140 MANUAL THERAPY 1/> REGIONS: CPT

## 2024-09-26 ENCOUNTER — HOSPITAL ENCOUNTER (OUTPATIENT)
Dept: PHYSICAL THERAPY | Facility: HOSPITAL | Age: 64
Setting detail: THERAPIES SERIES
Discharge: HOME | End: 2024-09-26
Payer: OTHER GOVERNMENT

## 2024-09-26 DIAGNOSIS — M25.512 CHRONIC LEFT SHOULDER PAIN: ICD-10-CM

## 2024-09-26 DIAGNOSIS — M25.812 IMPINGEMENT OF LEFT SHOULDER: Primary | ICD-10-CM

## 2024-09-26 DIAGNOSIS — G89.29 CHRONIC LEFT SHOULDER PAIN: ICD-10-CM

## 2024-09-26 DIAGNOSIS — M25.511 CHRONIC RIGHT SHOULDER PAIN: ICD-10-CM

## 2024-09-26 DIAGNOSIS — G89.29 CHRONIC RIGHT SHOULDER PAIN: ICD-10-CM

## 2024-09-26 DIAGNOSIS — M25.512 LEFT SHOULDER PAIN, UNSPECIFIED CHRONICITY: ICD-10-CM

## 2024-09-26 DIAGNOSIS — M54.12 RADICULOPATHY, CERVICAL: ICD-10-CM

## 2024-09-26 PROCEDURE — 97110 THERAPEUTIC EXERCISES: CPT

## 2024-09-26 PROCEDURE — 97140 MANUAL THERAPY 1/> REGIONS: CPT

## 2024-10-01 ENCOUNTER — HOSPITAL ENCOUNTER (OUTPATIENT)
Dept: PHYSICAL THERAPY | Facility: HOSPITAL | Age: 64
Setting detail: THERAPIES SERIES
Discharge: HOME | End: 2024-10-01
Payer: OTHER GOVERNMENT

## 2024-10-01 DIAGNOSIS — M25.512 CHRONIC LEFT SHOULDER PAIN: ICD-10-CM

## 2024-10-01 DIAGNOSIS — M25.512 LEFT SHOULDER PAIN, UNSPECIFIED CHRONICITY: ICD-10-CM

## 2024-10-01 DIAGNOSIS — G89.29 CHRONIC LEFT SHOULDER PAIN: ICD-10-CM

## 2024-10-01 DIAGNOSIS — M54.12 RADICULOPATHY, CERVICAL: ICD-10-CM

## 2024-10-01 DIAGNOSIS — G89.29 CHRONIC RIGHT SHOULDER PAIN: ICD-10-CM

## 2024-10-01 DIAGNOSIS — M25.511 CHRONIC RIGHT SHOULDER PAIN: ICD-10-CM

## 2024-10-01 DIAGNOSIS — M25.812 IMPINGEMENT OF LEFT SHOULDER: Primary | ICD-10-CM

## 2024-10-01 PROCEDURE — 97110 THERAPEUTIC EXERCISES: CPT

## 2024-10-01 PROCEDURE — 97140 MANUAL THERAPY 1/> REGIONS: CPT

## 2024-10-01 NOTE — THERAPY TREATMENT NOTE
Physical Therapy Treatment Note  [facility]  A [address], [place] [address]    Patient: Red Carver                                                 Visit Date: 10/1/2024  :     1960    Referring practitioner:    AME Wells*  Date of Initial Visit:          Type: THERAPY  Episode: L Shoulder impingement; R Shoulder Pain; R Cx radiculopathy    Visit Diagnoses:    ICD-10-CM ICD-9-CM   1. Impingement of left shoulder  M25.812 719.81   2. Chronic left shoulder pain  M25.512 719.41    G89.29 338.29   3. Chronic right shoulder pain  M25.511 719.41    G89.29 338.29   4. Radiculopathy, cervical  M54.12 723.4   5. Left shoulder pain, unspecified chronicity  M25.512 719.41       SUBJECTIVE     Subjective: States he is a little sore in his R shoulder after moving furniture yesterday, but has been doing good lately.     PAIN: 0/10     OBJECTIVE     Objective     Manual Therapy     50556  Comments   B subscap release    Cx distraction    B UT/LS stretches    IASTM w/ Powerboost to B post RC/UT/LS/lats    STM to B UT/LS and suboccipitals (R>L)        Timed Minutes 24     Therapeutic Exercises    27608 Units Comments   Standing lat pulldown @ cybex L3 x20    Straight arm pulldowns @ cybex L3 x20    Doorway sh flex/lat stretches 5x20 sec ea         Timed Minutes 16         Therapy Education/Self Care [id number]   Education offered today    Medbride Code    Ongoing HEP   Access Code: [email or web address]  URL: [email or web address]  Date: 2024  Prepared by: Elena Gill    Exercises  - Supine Shoulder Flexion AAROM with Hands Clasped  - 1 x daily - 7 x weekly - 3 sets - 10 reps  - Standing Shoulder Abduction AAROM with Dowel  - 1 x daily - 7 x weekly - 3 sets - 10 reps  - Seated Scapular Retraction  - 1 x daily - 7 x weekly - 3 sets - 10 reps - 5 hold  - Corner Pec Major Stretch  - 1 x daily - 7 x weekly - 3 reps - 30-60 sec  hold  - Seated Shoulder Flexion with Resistance  - 1 x daily - 7 x weekly - 2 sets - 10 reps  - Seated Shoulder Abduction with Resistance  - 1 x daily - 7 x weekly - 2 sets - 10 reps  - Seated Single Arm Shoulder External Rotation with Self-Anchored Resistance  - 1 x daily - 7 x weekly - 2 sets - 10 reps  - First Rib Mobilization with Strap  - 1 x daily - 7 x weekly - 5 reps - 10-15 sec hold   Timed Minutes        Total Timed Treatment:     40   mins  Total Time of Visit:             40   mins         ASSESSMENT/PLAN     GOALS  Goals                                                    Progress Note due by 10/20/24                                                                Recert due by 12/18/24   STG by: 4 weeks Comments Date Status   Decrease subjective pain to 2/10 7/10 neck  9/20  Progressing   Decreased muscle guarding  throughout shoulder girdle Reported catching sensation 9/20  ongoing   LTG by: 8 weeks         Symmetrical valerie shoulder flexion and abduction to at least 150 deg actively to improve ability to reach into overhead cabinets Flexion R 164 L 178 degrees, abduction R 165, L 160 degrees  9/20  Met   Able to resume household and work activities without exacerbation of symptoms Washing his back is the hardest 9/20 ongoing   Gross shoulder MMT at least 4+/5 to improve ability to lift items and groceries at home  See table 9/20 Met   Understands improved ergonomics for work and HEP for flexibility and stability Bolstered today  9/26 ongoing   Improve QuickDASH score to 20 or less for R  54.55 R shoulder 9/20 progressing   Reports no pain except occasional minor twinges no more than 2/10 See STG#1 9/20 ongoing   Reports eliminated R UE radicular symptoms for 1 week Reports constant 9/20 Ongoing   Be independent with HEP for L shoulder and R UE radicular pain  reports going well 9/20 Ongoing   Improve cervical rotation to 65 degrees bilaterally with report of no pain  R 58 L 54 degrees 9/20 Ongoing        Assessment/Plan     ASSESSMENT:   Pt reported some soreness in his R shoulder after moving furniture yesterday, noting he can tell he has more mobility. Noted no discomfort during treatment, but did report slight fatigue following cybex exercises. He presented with tightness in his B UT/LS/lats, with some TP activity present in all of the,. Following treatment he reported decreased tightness with improved B shoulder mobility.    PLAN:   Continue working toward increased first rib mobility and increased focus on cervical contributions to condition.     SIGNATURE: [facility], [place] License #: [id number]    Electronically Signed on 10/1/2024           [address] [place] [address]. [address]  [phone]

## 2024-10-03 ENCOUNTER — HOSPITAL ENCOUNTER (OUTPATIENT)
Dept: PHYSICAL THERAPY | Facility: HOSPITAL | Age: 64
Setting detail: THERAPIES SERIES
Discharge: HOME | End: 2024-10-03
Payer: OTHER GOVERNMENT

## 2024-10-03 DIAGNOSIS — M25.512 CHRONIC LEFT SHOULDER PAIN: ICD-10-CM

## 2024-10-03 DIAGNOSIS — M25.812 IMPINGEMENT OF LEFT SHOULDER: Primary | ICD-10-CM

## 2024-10-03 DIAGNOSIS — M54.12 RADICULOPATHY, CERVICAL: ICD-10-CM

## 2024-10-03 DIAGNOSIS — G89.29 CHRONIC LEFT SHOULDER PAIN: ICD-10-CM

## 2024-10-03 DIAGNOSIS — M25.511 CHRONIC RIGHT SHOULDER PAIN: ICD-10-CM

## 2024-10-03 DIAGNOSIS — M25.512 LEFT SHOULDER PAIN, UNSPECIFIED CHRONICITY: ICD-10-CM

## 2024-10-03 DIAGNOSIS — G89.29 CHRONIC RIGHT SHOULDER PAIN: ICD-10-CM

## 2024-10-03 PROCEDURE — 97140 MANUAL THERAPY 1/> REGIONS: CPT

## 2024-10-03 PROCEDURE — 97110 THERAPEUTIC EXERCISES: CPT

## 2024-10-03 NOTE — THERAPY TREATMENT NOTE
Physical Therapy Treatment Note  [facility]  A [address], [place] [address]    Patient: Red Carver                                                 Visit Date: 10/3/2024  :     1960    Referring practitioner:    AME Wells*  Date of Initial Visit:          Type: THERAPY  Episode: L Shoulder impingement; R Shoulder Pain; R Cx radiculopathy    Visit Diagnoses:    ICD-10-CM ICD-9-CM   1. Impingement of left shoulder  M25.812 719.81   2. Chronic left shoulder pain  M25.512 719.41    G89.29 338.29   3. Chronic right shoulder pain  M25.511 719.41    G89.29 338.29   4. Radiculopathy, cervical  M54.12 723.4   5. Left shoulder pain, unspecified chronicity  M25.512 719.41       SUBJECTIVE     Subjective: He can tell his motion is getting better. No pain, just tight. He was sore following last session and is still slightly sore.     PAIN: 0/10     OBJECTIVE     Objective     Manual Therapy     79891  Comments   STM w/ massage cream to B UT/LS and suboccipitals (R>L)    IASTM w/ blue ridged roller/double lacrosse ball to thoracic     Thoracic PA mobs and side glides     Cervical manual T/D              Timed Minutes 24     Therapeutic Exercises    73147 Units Comments   B unilateral flexion static holds     L 20 sec w/ 3 lb weight   R 15 sec w/ 2 lb weight   B unilateral abduction static holds    L 20 sec w/ 3 lb weight   R 15 sec w/ 2 lb weight   B unilateral eccentric flexion lowering   1x5 each  L 3lb weight   R 2lb weight   B unilateral eccentric abd lowering   1x5  L 3lb weight   R 2lb weight                  Timed Minutes 16         Therapy Education/Self Care 73405   Education offered today    Medbride Code    Ongoing HEP   Access Code: [email or web address]  URL: [email or web address]  Date: 2024  Prepared by: Elena Gill    Exercises  - Supine Shoulder Flexion AAROM with Hands Clasped  - 1 x daily - 7 x weekly  - 3 sets - 10 reps  - Standing Shoulder Abduction AAROM with Dowel  - 1 x daily - 7 x weekly - 3 sets - 10 reps  - Seated Scapular Retraction  - 1 x daily - 7 x weekly - 3 sets - 10 reps - 5 hold  - Corner Pec Major Stretch  - 1 x daily - 7 x weekly - 3 reps - 30-60 sec hold  - Seated Shoulder Flexion with Resistance  - 1 x daily - 7 x weekly - 2 sets - 10 reps  - Seated Shoulder Abduction with Resistance  - 1 x daily - 7 x weekly - 2 sets - 10 reps  - Seated Single Arm Shoulder External Rotation with Self-Anchored Resistance  - 1 x daily - 7 x weekly - 2 sets - 10 reps  - First Rib Mobilization with Strap  - 1 x daily - 7 x weekly - 5 reps - 10-15 sec hold   Timed Minutes        Total Timed Treatment:     40   mins  Total Time of Visit:             40   mins         ASSESSMENT/PLAN     GOALS  Goals                                                    Progress Note due by 10/20/24                                                                Recert due by 12/18/24   STG by: 4 weeks Comments Date Status   Decrease subjective pain to 2/10 7/10 neck  9/20  Progressing   Decreased muscle guarding  throughout shoulder girdle Reported catching sensation 9/20  ongoing   LTG by: 8 weeks         Symmetrical valerie shoulder flexion and abduction to at least 150 deg actively to improve ability to reach into overhead cabinets Flexion R 164 L 178 degrees, abduction R 165, L 160 degrees  9/20  Met   Able to resume household and work activities without exacerbation of symptoms Washing his back is the hardest 9/20 ongoing   Gross shoulder MMT at least 4+/5 to improve ability to lift items and groceries at home  See table 9/20 Met   Understands improved ergonomics for work and HEP for flexibility and stability Bolstered today  9/26 ongoing   Improve QuickDASH score to 20 or less for R  54.55 R shoulder 9/20 progressing   Reports no pain except occasional minor twinges no more than 2/10 See STG#1 9/20 ongoing   Reports eliminated R UE  radicular symptoms for 1 week Reports constant 9/20 Ongoing   Be independent with HEP for L shoulder and R UE radicular pain  reports going well 9/20 Ongoing   Improve cervical rotation to 65 degrees bilaterally with report of no pain  R 58 L 54 degrees 9/20 Ongoing       Assessment/Plan     ASSESSMENT:   Today we focused on progressing B shoulder endurance through isometric holds and eccentric lowering. He did very well although did verbalize fatigability. Concluded session with focus on cervical mobility. Mild guarding was present in his B UT.     PLAN:   Continue working toward increased first rib mobility and increased focus on cervical contributions to condition.     SIGNATURE: Elena Gill PTA, [place] License #: [id number]    Electronically Signed on 10/3/2024           [address] [place] [address], [place] [address]  [phone]

## 2024-10-08 ENCOUNTER — HOSPITAL ENCOUNTER (OUTPATIENT)
Dept: PHYSICAL THERAPY | Facility: HOSPITAL | Age: 64
Setting detail: THERAPIES SERIES
Discharge: HOME | End: 2024-10-08
Payer: OTHER GOVERNMENT

## 2024-10-08 DIAGNOSIS — M25.511 CHRONIC RIGHT SHOULDER PAIN: ICD-10-CM

## 2024-10-08 DIAGNOSIS — G89.29 CHRONIC LEFT SHOULDER PAIN: ICD-10-CM

## 2024-10-08 DIAGNOSIS — M25.812 IMPINGEMENT OF LEFT SHOULDER: Primary | ICD-10-CM

## 2024-10-08 DIAGNOSIS — M25.512 LEFT SHOULDER PAIN, UNSPECIFIED CHRONICITY: ICD-10-CM

## 2024-10-08 DIAGNOSIS — M54.12 RADICULOPATHY, CERVICAL: ICD-10-CM

## 2024-10-08 DIAGNOSIS — M25.512 CHRONIC LEFT SHOULDER PAIN: ICD-10-CM

## 2024-10-08 DIAGNOSIS — G89.29 CHRONIC RIGHT SHOULDER PAIN: ICD-10-CM

## 2024-10-08 PROCEDURE — 97140 MANUAL THERAPY 1/> REGIONS: CPT

## 2024-10-08 PROCEDURE — 97112 NEUROMUSCULAR REEDUCATION: CPT

## 2024-10-08 NOTE — THERAPY TREATMENT NOTE
Physical Therapy Treatment Note  [facility]  A department [place] [address], [place] [address]    Patient: Red Carver                                                 Visit Date: 10/8/2024  :     1960    Referring practitioner:    AME Wells*  Date of Initial Visit:          Type: THERAPY  Episode: L Shoulder impingement; R Shoulder Pain; R Cx radiculopathy    Visit Diagnoses:    ICD-10-CM ICD-9-CM   1. Impingement of left shoulder  M25.812 719.81   2. Chronic left shoulder pain  M25.512 719.41    G89.29 338.29   3. Chronic right shoulder pain  M25.511 719.41    G89.29 338.29   4. Radiculopathy, cervical  M54.12 723.4   5. Left shoulder pain, unspecified chronicity  M25.512 719.41       SUBJECTIVE     Subjective: He's been about the same since previous visit. He can tell his muscles are firming up and he has random various aches and pains from the nerves coming back, but things are going okay. His BUE mobility is improving. He is having some tingling from the R elbow down, and some pain down the humerus     PAIN: 6/10 R      OBJECTIVE     Objective     Neuromuscular Reeducation     02754 Comments   Scratch collapse testing Most positive at R wrist, pronator teres; very mildly at scalenes   Added nerve glides to HEP     Horizontal abd + flexion pulses 2x5 supine, 2x5 standing                   Timed Minutes 25     Manual Therapy     63084  Comments   Scraping to R FA/pronator teres, wrist flexors with edge tool    Cervical manual T/D      Cervical PA mobs    R rib inferior mobs            Timed Minutes 15       Therapy Education/Self Care [id number]   Education offered today    Medbride Code    Ongoing HEP   Access Code: [email or web address]  URL: [email or web address]  Date: 2024  Prepared by: Elena Gill    Exercises  - Supine Shoulder Flexion AAROM with Hands Clasped  - 1 x daily - 7 x weekly - 3 sets - 10 reps  - Standing  Shoulder Abduction AAROM with Dowel  - 1 x daily - 7 x weekly - 3 sets - 10 reps  - Seated Scapular Retraction  - 1 x daily - 7 x weekly - 3 sets - 10 reps - 5 hold  - Corner Pec Major Stretch  - 1 x daily - 7 x weekly - 3 reps - 30-60 sec hold  - Seated Shoulder Flexion with Resistance  - 1 x daily - 7 x weekly - 2 sets - 10 reps  - Seated Shoulder Abduction with Resistance  - 1 x daily - 7 x weekly - 2 sets - 10 reps  - Seated Single Arm Shoulder External Rotation with Self-Anchored Resistance  - 1 x daily - 7 x weekly - 2 sets - 10 reps  - First Rib Mobilization with Strap  - 1 x daily - 7 x weekly - 5 reps - 10-15 sec hold  Added on 10/8:  - Ulnar Nerve Flossing  - 1 x daily - 7 x weekly - 3 sets - 10 reps  - Median Nerve Flossing  - 1 x daily - 7 x weekly - 3 sets - 10 reps  - Radial Nerve Flossing  - 1 x daily - 7 x weekly - 3 sets - 10 reps   Timed Minutes        Total Timed Treatment:     40   mins  Total Time of Visit:             40   mins         ASSESSMENT/PLAN     GOALS  Goals                                                    Progress Note due by 10/20/24                                                                Recert due by 12/18/24   STG by: 4 weeks Comments Date Status   Decrease subjective pain to 2/10 7/10 neck  9/20  Progressing   Decreased muscle guarding  throughout shoulder girdle Reported catching sensation 9/20  ongoing   LTG by: 8 weeks         Symmetrical valerie shoulder flexion and abduction to at least 150 deg actively to improve ability to reach into overhead cabinets Flexion R 164 L 178 degrees, abduction R 165, L 160 degrees  9/20  Met   Able to resume household and work activities without exacerbation of symptoms Washing his back is the hardest 9/20 ongoing   Gross shoulder MMT at least 4+/5 to improve ability to lift items and groceries at home  See table 9/20 Met   Understands improved ergonomics for work and HEP for flexibility and stability Bolstered today  9/26 ongoing    Improve QuickDASH score to 20 or less for R  54.55 R shoulder 9/20 progressing   Reports no pain except occasional minor twinges no more than 2/10 See STG#1 9/20 ongoing   Reports eliminated R UE radicular symptoms for 1 week Reports constant 9/20 Ongoing   Be independent with HEP for L shoulder and R UE radicular pain  reports going well 9/20 Ongoing   Improve cervical rotation to 65 degrees bilaterally with report of no pain  R 58 L 54 degrees 9/20 Ongoing       Assessment/Plan     ASSESSMENT: Assessed scratch collapse testing and neural tension throughout the RUE, which revealed peripheral neural contributions to the n/t. He noted changes in symptoms with scraping to R forearm, and overall improvements following treatment today. Added neural glides to HEP.      PLAN:   Continue working toward increased first rib mobility and increased focus on cervical contributions to condition. Assess response to addition of nerve glides to HEP and previous session.    SIGNATURE: Krystyna Mccormack PT DPT, [place] License #: [id number]      Electronically Signed on 10/8/2024           [address] [place] [address] [place] [address]  [phone]

## 2024-10-10 ENCOUNTER — HOSPITAL ENCOUNTER (OUTPATIENT)
Dept: PHYSICAL THERAPY | Facility: HOSPITAL | Age: 64
Setting detail: THERAPIES SERIES
Discharge: HOME | End: 2024-10-10
Payer: OTHER GOVERNMENT

## 2024-10-10 DIAGNOSIS — M25.512 LEFT SHOULDER PAIN, UNSPECIFIED CHRONICITY: ICD-10-CM

## 2024-10-10 DIAGNOSIS — M54.12 RADICULOPATHY, CERVICAL: ICD-10-CM

## 2024-10-10 DIAGNOSIS — M25.812 IMPINGEMENT OF LEFT SHOULDER: Primary | ICD-10-CM

## 2024-10-10 DIAGNOSIS — G89.29 CHRONIC LEFT SHOULDER PAIN: ICD-10-CM

## 2024-10-10 DIAGNOSIS — M25.511 CHRONIC RIGHT SHOULDER PAIN: ICD-10-CM

## 2024-10-10 DIAGNOSIS — M25.512 CHRONIC LEFT SHOULDER PAIN: ICD-10-CM

## 2024-10-10 DIAGNOSIS — G89.29 CHRONIC RIGHT SHOULDER PAIN: ICD-10-CM

## 2024-10-10 PROCEDURE — 97140 MANUAL THERAPY 1/> REGIONS: CPT

## 2024-10-10 PROCEDURE — 97110 THERAPEUTIC EXERCISES: CPT

## 2024-10-10 NOTE — THERAPY TREATMENT NOTE
Physical Therapy Treatment Note  [facility]  A department [address] [address]    Patient: Red Carver                                                 Visit Date: 10/10/2024  :     1960    Referring practitioner:    AME Wells*  Date of Initial Visit:          Type: THERAPY  Episode: L Shoulder impingement; R Shoulder Pain; R Cx radiculopathy    Visit Diagnoses:    ICD-10-CM ICD-9-CM   1. Impingement of left shoulder  M25.812 719.81   2. Chronic left shoulder pain  M25.512 719.41    G89.29 338.29   3. Chronic right shoulder pain  M25.511 719.41    G89.29 338.29   4. Radiculopathy, cervical  M54.12 723.4   5. Left shoulder pain, unspecified chronicity  M25.512 719.41       SUBJECTIVE     Subjective: He felt benefit from the nerve glides. He is gaining so much sensation back in that there are some aches and pains but this is so much better than the numbness. The more he does his HEP, the easier they become.       PAIN: 6/10 R      OBJECTIVE     Objective   Therapeutic Exercises    86321 Units Comments   B UE unilateral static hold SB against the wall with perturbations in multiple directions   30 sec each direction   In flexion and abduction    B standing unilateral eccentric rows at cybex   1x5 each  Level 1   B standing unilateral eccentric latt pulls at cybex   1x5 each  Level 1   B standing unilateral eccentric ER at cybex  1x5  Level 1        Timed Minutes 10      Manual Therapy     02071  Comments   STM w/ massage cream to B UT/LS and suboccipitals (R>L)    Thoracic PA mobs and side glides     Scraping to R FA/pronator teres, wrist flexors with edge tool    Cervical manual T/D                      Timed Minutes 30       Therapy Education/Self Care 49409   Education offered today    Medbride Code    Ongoing HEP   Access Code: [email or web address]  URL: [email or web address]  Date: 2024  Prepared by: Elena  Gill    Exercises  - Supine Shoulder Flexion AAROM with Hands Clasped  - 1 x daily - 7 x weekly - 3 sets - 10 reps  - Standing Shoulder Abduction AAROM with Dowel  - 1 x daily - 7 x weekly - 3 sets - 10 reps  - Seated Scapular Retraction  - 1 x daily - 7 x weekly - 3 sets - 10 reps - 5 hold  - Corner Pec Major Stretch  - 1 x daily - 7 x weekly - 3 reps - 30-60 sec hold  - Seated Shoulder Flexion with Resistance  - 1 x daily - 7 x weekly - 2 sets - 10 reps  - Seated Shoulder Abduction with Resistance  - 1 x daily - 7 x weekly - 2 sets - 10 reps  - Seated Single Arm Shoulder External Rotation with Self-Anchored Resistance  - 1 x daily - 7 x weekly - 2 sets - 10 reps  - First Rib Mobilization with Strap  - 1 x daily - 7 x weekly - 5 reps - 10-15 sec hold  Added on 10/8:  - Ulnar Nerve Flossing  - 1 x daily - 7 x weekly - 3 sets - 10 reps  - Median Nerve Flossing  - 1 x daily - 7 x weekly - 3 sets - 10 reps  - Radial Nerve Flossing  - 1 x daily - 7 x weekly - 3 sets - 10 reps   Timed Minutes        Total Timed Treatment:     40   mins  Total Time of Visit:             40   mins         ASSESSMENT/PLAN     GOALS  Goals                                                    Progress Note due by 10/20/24                                                                Recert due by 12/18/24   STG by: 4 weeks Comments Date Status   Decrease subjective pain to 2/10 7/10 neck  9/20  Progressing   Decreased muscle guarding  throughout shoulder girdle Reported catching sensation 9/20  ongoing   LTG by: 8 weeks         Symmetrical valerie shoulder flexion and abduction to at least 150 deg actively to improve ability to reach into overhead cabinets Flexion R 164 L 178 degrees, abduction R 165, L 160 degrees  9/20  Met   Able to resume household and work activities without exacerbation of symptoms Washing his back is the hardest 9/20 ongoing   Gross shoulder MMT at least 4+/5 to improve ability to lift items and groceries at home  See  table 9/20 Met   Understands improved ergonomics for work and HEP for flexibility and stability Bolstered today  9/26 ongoing   Improve QuickDASH score to 20 or less for R  54.55 R shoulder 9/20 progressing   Reports no pain except occasional minor twinges no more than 2/10 See STG#1 9/20 ongoing   Reports eliminated R UE radicular symptoms for 1 week Reports constant 9/20 Ongoing   Be independent with HEP for L shoulder and R UE radicular pain  reports going well 9/20 Ongoing   Improve cervical rotation to 65 degrees bilaterally with report of no pain  R 58 L 54 degrees 9/20 Ongoing       Assessment/Plan     ASSESSMENT: He continues to do well with progression of shoulder strengthening. His R UT continues to be increasingly guarded. He has responded well to distal focus, therefore this was continued today.      PLAN:   Continue working toward increased first rib mobility and increased focus on cervical contributions to condition. Continue to address guarding distally as well.    SIGNATURE: Elena Gill PTA, [place] License #: [id number]      Electronically Signed on 10/10/2024           [address] [place] [address] [place] [address]  [phone]

## 2024-10-15 ENCOUNTER — HOSPITAL ENCOUNTER (OUTPATIENT)
Dept: PHYSICAL THERAPY | Facility: HOSPITAL | Age: 64
Setting detail: THERAPIES SERIES
Discharge: HOME | End: 2024-10-15
Payer: OTHER GOVERNMENT

## 2024-10-15 DIAGNOSIS — G89.29 CHRONIC RIGHT SHOULDER PAIN: ICD-10-CM

## 2024-10-15 DIAGNOSIS — M25.512 CHRONIC LEFT SHOULDER PAIN: ICD-10-CM

## 2024-10-15 DIAGNOSIS — M25.812 IMPINGEMENT OF LEFT SHOULDER: Primary | ICD-10-CM

## 2024-10-15 DIAGNOSIS — G89.29 CHRONIC LEFT SHOULDER PAIN: ICD-10-CM

## 2024-10-15 DIAGNOSIS — M25.511 CHRONIC RIGHT SHOULDER PAIN: ICD-10-CM

## 2024-10-15 DIAGNOSIS — M54.12 RADICULOPATHY, CERVICAL: ICD-10-CM

## 2024-10-15 DIAGNOSIS — M25.512 LEFT SHOULDER PAIN, UNSPECIFIED CHRONICITY: ICD-10-CM

## 2024-10-15 PROCEDURE — 97140 MANUAL THERAPY 1/> REGIONS: CPT

## 2024-10-15 NOTE — THERAPY TREATMENT NOTE
Physical Therapy Treatment Note  [facility]  A [address], [place] [address]    Patient: Red Carver                                                 Visit Date: 10/15/2024  :     1960    Referring practitioner:    AME Wells*  Date of Initial Visit:          Type: THERAPY  Episode: L Shoulder impingement; R Shoulder Pain; R Cx radiculopathy    Visit Diagnoses:    ICD-10-CM ICD-9-CM   1. Impingement of left shoulder  M25.812 719.81   2. Chronic left shoulder pain  M25.512 719.41    G89.29 338.29   3. Chronic right shoulder pain  M25.511 719.41    G89.29 338.29   4. Radiculopathy, cervical  M54.12 723.4   5. Left shoulder pain, unspecified chronicity  M25.512 719.41       SUBJECTIVE     Subjective: States he has a little pain and soreness today, noting he feels a heaviness in his R shoulder.       PAIN: 6/10 R      OBJECTIVE     Objective      Manual Therapy     42299  Comments   R UT/LS stretches    Cx distraction      R inf sh mobs    TPR to R UT/LS    IASTM w/ Powerboost L1 ball to R UT/LS/post RC    STM to B UT/LX/Cx dain    Seated pec stretch w/ bolster    Timed Minutes 40       Therapy Education/Self Care 28781   Education offered today    Medbride Code    Ongoing HEP   Access Code: [email or web address]  URL: [email or web address]  Date: 2024  Prepared by: Elena Gill    Exercises  - Supine Shoulder Flexion AAROM with Hands Clasped  - 1 x daily - 7 x weekly - 3 sets - 10 reps  - Standing Shoulder Abduction AAROM with Dowel  - 1 x daily - 7 x weekly - 3 sets - 10 reps  - Seated Scapular Retraction  - 1 x daily - 7 x weekly - 3 sets - 10 reps - 5 hold  - Corner Pec Major Stretch  - 1 x daily - 7 x weekly - 3 reps - 30-60 sec hold  - Seated Shoulder Flexion with Resistance  - 1 x daily - 7 x weekly - 2 sets - 10 reps  - Seated Shoulder Abduction with Resistance  - 1 x daily - 7 x weekly - 2 sets - 10  reps  - Seated Single Arm Shoulder External Rotation with Self-Anchored Resistance  - 1 x daily - 7 x weekly - 2 sets - 10 reps  - First Rib Mobilization with Strap  - 1 x daily - 7 x weekly - 5 reps - 10-15 sec hold  Added on 10/8:  - Ulnar Nerve Flossing  - 1 x daily - 7 x weekly - 3 sets - 10 reps  - Median Nerve Flossing  - 1 x daily - 7 x weekly - 3 sets - 10 reps  - Radial Nerve Flossing  - 1 x daily - 7 x weekly - 3 sets - 10 reps   Timed Minutes        Total Timed Treatment:     40   mins  Total Time of Visit:             40   mins         ASSESSMENT/PLAN     GOALS  Goals                                                    Progress Note due by 10/20/24                                                                Recert due by 12/18/24   STG by: 4 weeks Comments Date Status   Decrease subjective pain to 2/10 7/10 neck  9/20  Progressing   Decreased muscle guarding  throughout shoulder girdle Reported catching sensation 9/20  ongoing   LTG by: 8 weeks         Symmetrical valerie shoulder flexion and abduction to at least 150 deg actively to improve ability to reach into overhead cabinets Flexion R 164 L 178 degrees, abduction R 165, L 160 degrees  9/20  Met   Able to resume household and work activities without exacerbation of symptoms Washing his back is the hardest 9/20 ongoing   Gross shoulder MMT at least 4+/5 to improve ability to lift items and groceries at home  See table 9/20 Met   Understands improved ergonomics for work and HEP for flexibility and stability Bolstered today  9/26 ongoing   Improve QuickDASH score to 20 or less for R  54.55 R shoulder 9/20 progressing   Reports no pain except occasional minor twinges no more than 2/10 See STG#1 9/20 ongoing   Reports eliminated R UE radicular symptoms for 1 week Reports constant 9/20 Ongoing   Be independent with HEP for L shoulder and R UE radicular pain  reports going well 9/20 Ongoing   Improve cervical rotation to 65 degrees bilaterally with report of  no pain  R 58 L 54 degrees 9/20 Ongoing       Assessment/Plan     ASSESSMENT: Pt reported cont R shoulder tightness and soreness, noting the more he does the more he feels it. He presented with cont R UT/LS tightness, with some TP activity in his R Cx dain and UT/LS. Following treatment he reported decreased soreness and heaviness in his shoulder.      PLAN:   Continue working toward increased first rib mobility and increased focus on cervical contributions to condition. Continue to address guarding distally as well.    SIGNATURE: Tyrone Acuña [facility], [place] License #: [id number]      Electronically Signed on 10/15/2024           [address] [place] [address]. [address]  [phone]

## 2024-10-17 ENCOUNTER — HOSPITAL ENCOUNTER (OUTPATIENT)
Dept: PHYSICAL THERAPY | Facility: HOSPITAL | Age: 64
Setting detail: THERAPIES SERIES
Discharge: HOME | End: 2024-10-17
Payer: OTHER GOVERNMENT

## 2024-10-17 DIAGNOSIS — M25.812 IMPINGEMENT OF LEFT SHOULDER: Primary | ICD-10-CM

## 2024-10-17 DIAGNOSIS — M25.511 CHRONIC RIGHT SHOULDER PAIN: ICD-10-CM

## 2024-10-17 DIAGNOSIS — M54.12 RADICULOPATHY, CERVICAL: ICD-10-CM

## 2024-10-17 DIAGNOSIS — M25.512 CHRONIC LEFT SHOULDER PAIN: ICD-10-CM

## 2024-10-17 DIAGNOSIS — G89.29 CHRONIC RIGHT SHOULDER PAIN: ICD-10-CM

## 2024-10-17 DIAGNOSIS — M25.512 LEFT SHOULDER PAIN, UNSPECIFIED CHRONICITY: ICD-10-CM

## 2024-10-17 DIAGNOSIS — G89.29 CHRONIC LEFT SHOULDER PAIN: ICD-10-CM

## 2024-10-17 PROCEDURE — 97110 THERAPEUTIC EXERCISES: CPT

## 2024-10-17 PROCEDURE — 97140 MANUAL THERAPY 1/> REGIONS: CPT

## 2024-10-17 NOTE — THERAPY TREATMENT NOTE
Patient: Red Carver           : 1960  Visit Date: 10/17/2024  Referring practitioner: MAE Wells*  Date of Initial Visit: Type: THERAPY  Episode: L Shoulder impingement; R Shoulder Pain; R Cx radiculopathy    Visit Diagnoses:    ICD-10-CM ICD-9-CM   1. Impingement of left shoulder  M25.812 719.81   2. Chronic left shoulder pain  M25.512 719.41    G89.29 338.29   3. Chronic right shoulder pain  M25.511 719.41    G89.29 338.29   4. Radiculopathy, cervical  M54.12 723.4   5. Left shoulder pain, unspecified chronicity  M25.512 719.41       PT G-Codes  Outcome Measure Options: Quick DASH  Quick DASH Score: 56.82  Clinical Progress: improved  Home Program Compliance: Yes  Progress toward previous goals: Partially Met  Prognosis to achieve goals: good    Objective     Assessment & Plan       Assessment  Impairments: abnormal muscle tone, abnormal or restricted ROM, activity intolerance, lacks appropriate home exercise program and pain with function   Functional limitations: lifting, uncomfortable because of pain, reaching behind back, reaching overhead and unable to perform repetitive tasks   Prognosis: good    Plan  Therapy options: will be seen for skilled therapy services  Planned modality interventions: dry needling, low level laser therapy and TENS  Planned therapy interventions: soft tissue mobilization, manual therapy, stretching, strengthening, joint mobilization, functional ROM exercises, therapeutic activities, neuromuscular re-education, body mechanics training, flexibility and home exercise program  Frequency: 2x week  Duration in weeks: 12  Treatment plan discussed with: PTA        Anticipated CPT codes: Therapeutic Exercise 23615, Manual Therapy 64737, Therapeutic Activity 36835, Neuromuscular ReEducation 45859, Self Care/Home Management 48274, Estim Attended 30086, and Estim Unattended 11638    I reviewed the treatment and goals with Humble Acuña PTA and agree with the  POC.    SIGNATURE: Gregorio Gotti PT, License #: [id number]  Electronically Signed on 10/17/2024

## 2024-10-17 NOTE — THERAPY TREATMENT NOTE
Physical Therapy Treatment Note and 30 Day Progress Note  [facility]  A department [address] [place], [place] [address]    Patient: Red Carver                                                 Visit Date: 10/17/2024  :     1960    Referring practitioner:    AME Wells*  Date of Initial Visit:          Type: THERAPY  Episode: L Shoulder impingement; R Shoulder Pain; R Cx radiculopathy    Visit Diagnoses:    ICD-10-CM ICD-9-CM   1. Impingement of left shoulder  M25.812 719.81   2. Chronic left shoulder pain  M25.512 719.41    G89.29 338.29   3. Chronic right shoulder pain  M25.511 719.41    G89.29 338.29   4. Radiculopathy, cervical  M54.12 723.4   5. Left shoulder pain, unspecified chronicity  M25.512 719.41       SUBJECTIVE     Subjective: States his R shoulder feels better since last visit, noting he still has the pain down his arm. He will have some heaviness and coldness down his arm into his hand. He still has tightness and pain on the R side of his neck.      PAIN: 5/10 R sh; 5-6/10 Cx     OBJECTIVE     Objective      Therapeutic Exercises    78659 Units Comments   Assessed goals for PN          Timed Minutes 11       Manual Therapy     24692  Comments   STM/TPR R UT/LS/Cx dain    R UT/LS stretches    Cx distraction      IASTM w/ Powerboost L1 thumb to R UT/LS    Seated pec stretch w/ bolster    Timed Minutes 30       Therapy Education/Self Care 56156   Education offered today    Medbride Code    Ongoing HEP   Access Code: [email or web address]  URL: [email or web address]  Date: 2024  Prepared by: Elena Gill    Exercises  - Supine Shoulder Flexion AAROM with Hands Clasped  - 1 x daily - 7 x weekly - 3 sets - 10 reps  - Standing Shoulder Abduction AAROM with Dowel  - 1 x daily - 7 x weekly - 3 sets - 10 reps  - Seated Scapular Retraction  - 1 x daily - 7 x weekly - 3 sets - 10 reps - 5 hold  - Corner Pec Major Stretch  -  1 x daily - 7 x weekly - 3 reps - 30-60 sec hold  - Seated Shoulder Flexion with Resistance  - 1 x daily - 7 x weekly - 2 sets - 10 reps  - Seated Shoulder Abduction with Resistance  - 1 x daily - 7 x weekly - 2 sets - 10 reps  - Seated Single Arm Shoulder External Rotation with Self-Anchored Resistance  - 1 x daily - 7 x weekly - 2 sets - 10 reps  - First Rib Mobilization with Strap  - 1 x daily - 7 x weekly - 5 reps - 10-15 sec hold  Added on 10/8:  - Ulnar Nerve Flossing  - 1 x daily - 7 x weekly - 3 sets - 10 reps  - Median Nerve Flossing  - 1 x daily - 7 x weekly - 3 sets - 10 reps  - Radial Nerve Flossing  - 1 x daily - 7 x weekly - 3 sets - 10 reps   Timed Minutes        Total Timed Treatment:     41   mins  Total Time of Visit:             41   mins         ASSESSMENT/PLAN     GOALS  Goals                                                    Progress Note due by 11/16/24                                                                Recert due by 12/18/24   STG by: 4 weeks Comments Date Status   Decrease subjective pain to 2/10 5-6/10 neck  10/17  Progressing   Decreased muscle guarding  throughout shoulder girdle No more catching sensation 10/17  ongoing   LTG by: 8 weeks         Symmetrical valerie shoulder flexion and abduction to at least 150 deg actively to improve ability to reach into overhead cabinets Flexion R 164 L 178 degrees, abduction R 165, L 160 degrees  9/20  Met   Able to resume household and work activities without exacerbation of symptoms Washing his back is the hardest 10/17 ongoing   Gross shoulder MMT at least 4+/5 to improve ability to lift items and groceries at home  See table 9/20 Met   Understands improved ergonomics for work and HEP for flexibility and stability Performs daily  10/17 ongoing   Improve QuickDASH score to 20 or less for R  56.82 R shoulder 10/17 progressing   Reports no pain except occasional minor twinges no more than 2/10 See STG#1 10/17 ongoing   Reports eliminated R  UE radicular symptoms for 1 week Reports constant 10/17 Ongoing   Be independent with HEP for L shoulder and R UE radicular pain  still some rad s/s in his RUE 10/17 Ongoing   Improve cervical rotation to 65 degrees bilaterally with report of no pain  R 56 L 56 degrees 10/17 Ongoing       Assessment/Plan     ASSESSMENT: Pt reported some improvement since SOC, noting he still has some rad s/s down his RUE with certain movements. His L shoulder has been doing much better, and after last visit his R shoulder does not feel as tight. He still has tightness and TP activity in his R UT/LS. Skilled PT is indicated to improve his quality of life while increasing his ability to perform daily tasks and activities.     PLAN:   Continue working toward increased first rib mobility and increased focus on cervical contributions to condition. Continue to address guarding distally as well.    SIGNATURE: Tyrone Acuña PTA, [place] License #: [id number]      Electronically Signed on 10/17/2024           [address] [place] [address] [place]  [place] [address]  [phone]

## 2024-10-22 ENCOUNTER — HOSPITAL ENCOUNTER (OUTPATIENT)
Dept: PHYSICAL THERAPY | Facility: HOSPITAL | Age: 64
Setting detail: THERAPIES SERIES
Discharge: HOME | End: 2024-10-22
Payer: OTHER GOVERNMENT

## 2024-10-22 DIAGNOSIS — M25.511 CHRONIC RIGHT SHOULDER PAIN: ICD-10-CM

## 2024-10-22 DIAGNOSIS — M25.512 LEFT SHOULDER PAIN, UNSPECIFIED CHRONICITY: ICD-10-CM

## 2024-10-22 DIAGNOSIS — G89.29 CHRONIC RIGHT SHOULDER PAIN: ICD-10-CM

## 2024-10-22 DIAGNOSIS — G89.29 CHRONIC LEFT SHOULDER PAIN: ICD-10-CM

## 2024-10-22 DIAGNOSIS — M25.512 CHRONIC LEFT SHOULDER PAIN: ICD-10-CM

## 2024-10-22 DIAGNOSIS — M54.12 RADICULOPATHY, CERVICAL: ICD-10-CM

## 2024-10-22 DIAGNOSIS — M25.812 IMPINGEMENT OF LEFT SHOULDER: Primary | ICD-10-CM

## 2024-10-22 PROCEDURE — G0283 ELEC STIM OTHER THAN WOUND: HCPCS

## 2024-10-22 PROCEDURE — 97110 THERAPEUTIC EXERCISES: CPT

## 2024-10-22 PROCEDURE — 97140 MANUAL THERAPY 1/> REGIONS: CPT

## 2024-10-22 NOTE — THERAPY TREATMENT NOTE
Physical Therapy Treatment Note  [facility]  A department [address], [place] [address]    Patient: Red Carver                                                 Visit Date: 10/22/2024  :     1960    Referring practitioner:    AME eWlls*  Date of Initial Visit:          Type: THERAPY  Episode: L Shoulder impingement; R Shoulder Pain; R Cx radiculopathy    Visit Diagnoses:    ICD-10-CM ICD-9-CM   1. Impingement of left shoulder  M25.812 719.81   2. Chronic left shoulder pain  M25.512 719.41    G89.29 338.29   3. Chronic right shoulder pain  M25.511 719.41    G89.29 338.29   4. Radiculopathy, cervical  M54.12 723.4   5. Left shoulder pain, unspecified chronicity  M25.512 719.41       SUBJECTIVE     Subjective: States he still feels the heaviness in his R shoulder and arm, noting he does not have the same pain when raising his arm. He still has tightness and pain on the R side of his neck.      PAIN: 5/10 R sh; 0/10 Cx sitting, 6/10 moving     OBJECTIVE     Objective      Therapeutic Exercises    32618 Units Comments   Seated pec stretch w/ bolster     Seated 1 rib mob w/ belt          Timed Minutes 10     Modalities Comments   IFC estim  R UT/LS    21 mA   Minutes 10       Manual Therapy     49119  Comments   STM/TPR R UT/LS/Cx dain    R UT/LS stretches    Cx distraction      TPR to R UT/LS    IASTM w/ Powerboost L1 ball to R UT/LS    Timed Minutes 22       Therapy Education/Self Care 10088   Education offered today    Medbride Code    Ongoing HEP   Access Code: [email or web address]  URL: [email or web address]  Date: 2024  Prepared by: Elena Gill    Exercises  - Supine Shoulder Flexion AAROM with Hands Clasped  - 1 x daily - 7 x weekly - 3 sets - 10 reps  - Standing Shoulder Abduction AAROM with Dowel  - 1 x daily - 7 x weekly - 3 sets - 10 reps  - Seated Scapular Retraction  - 1 x daily - 7 x weekly - 3 sets - 10 reps  - 5 hold  - Corner Pec Major Stretch  - 1 x daily - 7 x weekly - 3 reps - 30-60 sec hold  - Seated Shoulder Flexion with Resistance  - 1 x daily - 7 x weekly - 2 sets - 10 reps  - Seated Shoulder Abduction with Resistance  - 1 x daily - 7 x weekly - 2 sets - 10 reps  - Seated Single Arm Shoulder External Rotation with Self-Anchored Resistance  - 1 x daily - 7 x weekly - 2 sets - 10 reps  - First Rib Mobilization with Strap  - 1 x daily - 7 x weekly - 5 reps - 10-15 sec hold  Added on 10/8:  - Ulnar Nerve Flossing  - 1 x daily - 7 x weekly - 3 sets - 10 reps  - Median Nerve Flossing  - 1 x daily - 7 x weekly - 3 sets - 10 reps  - Radial Nerve Flossing  - 1 x daily - 7 x weekly - 3 sets - 10 reps   Timed Minutes        Total Timed Treatment:     42   mins  Total Time of Visit:             42   mins         ASSESSMENT/PLAN     GOALS  Goals                                                    Progress Note due by 11/16/24                                                                Recert due by 12/18/24   STG by: 4 weeks Comments Date Status   Decrease subjective pain to 2/10 5-6/10 neck  10/17  Progressing   Decreased muscle guarding  throughout shoulder girdle No more catching sensation 10/17  ongoing   LTG by: 8 weeks         Symmetrical valerie shoulder flexion and abduction to at least 150 deg actively to improve ability to reach into overhead cabinets Flexion R 164 L 178 degrees, abduction R 165, L 160 degrees  9/20  Met   Able to resume household and work activities without exacerbation of symptoms Washing his back is the hardest 10/17 ongoing   Gross shoulder MMT at least 4+/5 to improve ability to lift items and groceries at home  See table 9/20 Met   Understands improved ergonomics for work and HEP for flexibility and stability Performs daily  10/17 ongoing   Improve QuickDASH score to 20 or less for R  56.82 R shoulder 10/17 progressing   Reports no pain except occasional minor twinges no more than 2/10 See  STG#1 10/17 ongoing   Reports eliminated R UE radicular symptoms for 1 week Reports constant 10/17 Ongoing   Be independent with HEP for L shoulder and R UE radicular pain  performs daily 10/17 Ongoing   Improve cervical rotation to 65 degrees bilaterally with report of no pain  R 56 L 56 degrees 10/17 Ongoing       Assessment/Plan     ASSESSMENT: Pt reported cont tightness in his R Cx along with the heaviness in his RUE, noting the rad s/s in his RUE is not as bad. He presented with cont tightness and TP activity in his R UT/LS, with tenderness around his R first rib. He reported decreased pain and tightness with improved Cx and RUE mobility following treatment.      PLAN:   Continue working toward increased first rib mobility and increased focus on cervical contributions to condition. Continue to address guarding distally as well.    SIGNATURE: Tyrone Acuña PTA, [place] License #: [id number]    Electronically Signed on 10/22/2024           [address] [place] [address] [place] [address]  [phone]

## 2024-10-24 ENCOUNTER — HOSPITAL ENCOUNTER (OUTPATIENT)
Dept: PHYSICAL THERAPY | Facility: HOSPITAL | Age: 64
Setting detail: THERAPIES SERIES
Discharge: HOME | End: 2024-10-24
Payer: OTHER GOVERNMENT

## 2024-10-24 DIAGNOSIS — M25.512 LEFT SHOULDER PAIN, UNSPECIFIED CHRONICITY: ICD-10-CM

## 2024-10-24 DIAGNOSIS — M25.812 IMPINGEMENT OF LEFT SHOULDER: Primary | ICD-10-CM

## 2024-10-24 DIAGNOSIS — M25.512 CHRONIC LEFT SHOULDER PAIN: ICD-10-CM

## 2024-10-24 DIAGNOSIS — M25.511 CHRONIC RIGHT SHOULDER PAIN: ICD-10-CM

## 2024-10-24 DIAGNOSIS — M54.12 RADICULOPATHY, CERVICAL: ICD-10-CM

## 2024-10-24 DIAGNOSIS — G89.29 CHRONIC LEFT SHOULDER PAIN: ICD-10-CM

## 2024-10-24 DIAGNOSIS — G89.29 CHRONIC RIGHT SHOULDER PAIN: ICD-10-CM

## 2024-10-24 PROCEDURE — 97140 MANUAL THERAPY 1/> REGIONS: CPT

## 2024-10-24 PROCEDURE — 97110 THERAPEUTIC EXERCISES: CPT

## 2024-10-24 NOTE — THERAPY TREATMENT NOTE
Physical Therapy Treatment Note  [facility]  A department [address], [place] [address]    Patient: Red Carver                                                 Visit Date: 10/24/2024  :     1960    Referring practitioner:    AME Wells*  Date of Initial Visit:          Type: THERAPY  Episode: L Shoulder impingement; R Shoulder Pain; R Cx radiculopathy    Visit Diagnoses:    ICD-10-CM ICD-9-CM   1. Impingement of left shoulder  M25.812 719.81   2. Chronic left shoulder pain  M25.512 719.41    G89.29 338.29   3. Chronic right shoulder pain  M25.511 719.41    G89.29 338.29   4. Radiculopathy, cervical  M54.12 723.4   5. Left shoulder pain, unspecified chronicity  M25.512 719.41         SUBJECTIVE     Subjective: He's making progress and getting stronger. He can tell that the rib stuff is really contributing. He demonstrates ability to raise arms overhead with ease       PAIN: 5/10 R sh; 0/10 sitting,      OBJECTIVE     Objective     Therapeutic Exercises    83859 Units Comments   3 part progressive pec stretch over pink bolster  X1 min ea    Cybex lat pulls x6  Lvl 6 1x6   Lvl 5 2x6   Chin tucks at wall  2x10x5' ea                    Timed Minutes 26     Manual Therapy     10747  Comments   Cervical manual T/D     R rib inferior mobs    STM to R scalenes     TPR to R UT, LS                 Timed Minutes 15            Therapy Education/Self Care 01232   Education offered today    Medbride Code    Ongoing HEP   Access Code: [email or web address]  URL: [email or web address]  Date: 2024  Prepared by: Elena Gill    Exercises  - Supine Shoulder Flexion AAROM with Hands Clasped  - 1 x daily - 7 x weekly - 3 sets - 10 reps  - Standing Shoulder Abduction AAROM with Dowel  - 1 x daily - 7 x weekly - 3 sets - 10 reps  - Seated Scapular Retraction  - 1 x daily - 7 x weekly - 3 sets - 10 reps - 5 hold  - Corner Pec Major Stretch  - 1  x daily - 7 x weekly - 3 reps - 30-60 sec hold  - Seated Shoulder Flexion with Resistance  - 1 x daily - 7 x weekly - 2 sets - 10 reps  - Seated Shoulder Abduction with Resistance  - 1 x daily - 7 x weekly - 2 sets - 10 reps  - Seated Single Arm Shoulder External Rotation with Self-Anchored Resistance  - 1 x daily - 7 x weekly - 2 sets - 10 reps  - First Rib Mobilization with Strap  - 1 x daily - 7 x weekly - 5 reps - 10-15 sec hold  Added on 10/8:  - Ulnar Nerve Flossing  - 1 x daily - 7 x weekly - 3 sets - 10 reps  - Median Nerve Flossing  - 1 x daily - 7 x weekly - 3 sets - 10 reps  - Radial Nerve Flossing  - 1 x daily - 7 x weekly - 3 sets - 10 reps   Timed Minutes        Total Timed Treatment:     41   mins  Total Time of Visit:             41   mins         ASSESSMENT/PLAN     GOALS  Goals                                                    Progress Note due by 11/16/24                                                                Recert due by 12/18/24   STG by: 4 weeks Comments Date Status   Decrease subjective pain to 2/10 5-6/10 neck  10/17  Progressing   Decreased muscle guarding  throughout shoulder girdle Greatly decreased muscle guarding, noting he feels that it is softer as well.  10/24  ongoing   LTG by: 8 weeks         Symmetrical valerie shoulder flexion and abduction to at least 150 deg actively to improve ability to reach into overhead cabinets Flexion R 164 L 178 degrees, abduction R 165, L 160 degrees  9/20  Met   Able to resume household and work activities without exacerbation of symptoms Washing his back is the hardest 10/17 ongoing   Gross shoulder MMT at least 4+/5 to improve ability to lift items and groceries at home  See table 9/20 Met   Understands improved ergonomics for work and HEP for flexibility and stability Performs daily  10/17 ongoing   Improve QuickDASH score to 20 or less for R  56.82 R shoulder 10/17 progressing   Reports no pain except occasional minor twinges no more than  2/10 See STG#1 10/17 ongoing   Reports eliminated R UE radicular symptoms for 1 week Reports constant 10/17 Ongoing   Be independent with HEP for L shoulder and R UE radicular pain  performs daily 10/17 Ongoing   Improve cervical rotation to 65 degrees bilaterally with report of no pain  R 56 L 56 degrees 10/17 Ongoing       Assessment/Plan     ASSESSMENT: Continued working toward increasing space availability in R thoracic outlet and increasing R first rib mobility. He is progressing well, though notes continued symptoms through R arm. He is in good spirits and happy with his progress up to this point.     PLAN:   Continue working toward increased first rib mobility and increased focus on cervical contributions to condition. Continue to address guarding distally as well.    SIGNATURE: Krystyna Mccormack PT DPT, [place] License #: [id number]      Electronically Signed on 10/24/2024           [address] [place] [address]  [phone]

## 2024-11-05 ENCOUNTER — HOSPITAL ENCOUNTER (OUTPATIENT)
Dept: PHYSICAL THERAPY | Facility: HOSPITAL | Age: 64
Setting detail: THERAPIES SERIES
Discharge: HOME | End: 2024-11-05
Payer: OTHER GOVERNMENT

## 2024-11-05 ENCOUNTER — TRANSCRIBE ORDERS (OUTPATIENT)
Dept: PHYSICAL THERAPY | Facility: HOSPITAL | Age: 64
End: 2024-11-05
Payer: OTHER GOVERNMENT

## 2024-11-05 DIAGNOSIS — M25.512 CHRONIC LEFT SHOULDER PAIN: ICD-10-CM

## 2024-11-05 DIAGNOSIS — M25.512 LEFT SHOULDER PAIN, UNSPECIFIED CHRONICITY: ICD-10-CM

## 2024-11-05 DIAGNOSIS — M25.812 IMPINGEMENT OF LEFT SHOULDER: Primary | ICD-10-CM

## 2024-11-05 DIAGNOSIS — G89.29 CHRONIC RIGHT SHOULDER PAIN: ICD-10-CM

## 2024-11-05 DIAGNOSIS — M25.511 CHRONIC RIGHT SHOULDER PAIN: ICD-10-CM

## 2024-11-05 DIAGNOSIS — M54.12 RADICULOPATHY, CERVICAL REGION: Primary | ICD-10-CM

## 2024-11-05 DIAGNOSIS — M54.12 RADICULOPATHY, CERVICAL: ICD-10-CM

## 2024-11-05 DIAGNOSIS — G89.29 CHRONIC LEFT SHOULDER PAIN: ICD-10-CM

## 2024-11-05 PROCEDURE — 97140 MANUAL THERAPY 1/> REGIONS: CPT

## 2024-11-05 PROCEDURE — 97110 THERAPEUTIC EXERCISES: CPT

## 2024-11-05 NOTE — THERAPY TREATMENT NOTE
Physical Therapy Treatment Note  [facility]  A [address] [place], [place] [address]    Patient: Red Carver                                                 Visit Date: 2024  :     1960    Referring practitioner:    AME Wells*  Date of Initial Visit:          Type: THERAPY  Episode: L Shoulder impingement; R Shoulder Pain; R Cx radiculopathy    Visit Diagnoses:    ICD-10-CM ICD-9-CM   1. Impingement of left shoulder  M25.812 719.81   2. Chronic left shoulder pain  M25.512 719.41    G89.29 338.29   3. Chronic right shoulder pain  M25.511 719.41    G89.29 338.29   4. Radiculopathy, cervical  M54.12 723.4   5. Left shoulder pain, unspecified chronicity  M25.512 719.41         SUBJECTIVE     Subjective: States he has been using his tens unit at home and it feels like is has been helping. Notes the heaviness in his R arm is getting better, and he feels lighter throughout the day compared to before.      PAIN: 0/10 sitting; 4/10 when moving certain ways     OBJECTIVE     Objective     Therapeutic Exercises    30595 Units Comments   BUE straight arm sh flex @ cybex L1.5 w/ weight bar x6    NIMO straight arm sh flex @ cybex L1 X10 ea    Straight arm pulldown @ cybex L3 2x10    UE bike L4 6 min 1 in fwd/1 min bwd   Timed Minutes 17     Manual Therapy     67213  Comments   Cervical manual T/D     STM to B UT/LS/Cx dain/interior scapular muscles    BUE LAD    L subscap release    TPR to B UT/LS    B UT/LS stretches    Timed Minutes 25          Therapy Education/Self Care 03982   Education offered today    Medbride Code    Ongoing HEP   Access Code: [email or web address]  URL: [email or web address]  Date: 2024  Prepared by: Elena Gill    Exercises  - Supine Shoulder Flexion AAROM with Hands Clasped  - 1 x daily - 7 x weekly - 3 sets - 10 reps  - Standing Shoulder Abduction AAROM with Dowel  - 1 x daily - 7 x weekly - 3  sets - 10 reps  - Seated Scapular Retraction  - 1 x daily - 7 x weekly - 3 sets - 10 reps - 5 hold  - Corner Pec Major Stretch  - 1 x daily - 7 x weekly - 3 reps - 30-60 sec hold  - Seated Shoulder Flexion with Resistance  - 1 x daily - 7 x weekly - 2 sets - 10 reps  - Seated Shoulder Abduction with Resistance  - 1 x daily - 7 x weekly - 2 sets - 10 reps  - Seated Single Arm Shoulder External Rotation with Self-Anchored Resistance  - 1 x daily - 7 x weekly - 2 sets - 10 reps  - First Rib Mobilization with Strap  - 1 x daily - 7 x weekly - 5 reps - 10-15 sec hold  Added on 10/8:  - Ulnar Nerve Flossing  - 1 x daily - 7 x weekly - 3 sets - 10 reps  - Median Nerve Flossing  - 1 x daily - 7 x weekly - 3 sets - 10 reps  - Radial Nerve Flossing  - 1 x daily - 7 x weekly - 3 sets - 10 reps   Timed Minutes        Total Timed Treatment:     42   mins  Total Time of Visit:             42   mins         ASSESSMENT/PLAN     GOALS  Goals                                                    Progress Note due by 11/16/24                                                                Recert due by 12/18/24   STG by: 4 weeks Comments Date Status   Decrease subjective pain to 2/10 5-6/10 neck  10/17  Progressing   Decreased muscle guarding  throughout shoulder girdle Greatly decreased muscle guarding, noting he feels that it is softer as well.  10/24  ongoing   LTG by: 8 weeks         Symmetrical valerie shoulder flexion and abduction to at least 150 deg actively to improve ability to reach into overhead cabinets Flexion R 164 L 178 degrees, abduction R 165, L 160 degrees  9/20  Met   Able to resume household and work activities without exacerbation of symptoms Washing his back is the hardest 10/17 ongoing   Gross shoulder MMT at least 4+/5 to improve ability to lift items and groceries at home  See table 9/20 Met   Understands improved ergonomics for work and HEP for flexibility and stability Performs daily  10/17 ongoing   Improve  QuickDASH score to 20 or less for R  56.82 R shoulder 10/17 progressing   Reports no pain except occasional minor twinges no more than 2/10 See STG#1 10/17 ongoing   Reports eliminated R UE radicular symptoms for 1 week Reports constant 10/17 Ongoing   Be independent with HEP for L shoulder and R UE radicular pain  performs daily 10/17 Ongoing   Improve cervical rotation to 65 degrees bilaterally with report of no pain  R 56 L 56 degrees 10/17 Ongoing       Assessment/Plan     ASSESSMENT: Pt reported cont R shoulder/UE heaviness, noting it has gotten better but still feels weakness in his L shoulder with certain activities. He noted some L shoulder discomfort during BUE sh flex, but after switching to NIMO it decreased. He presented with tightness and TP activity in his L subscap region as well as his B UT/LS. He reported decreased pain with improved B shoulder and Cx mobility following treatment.     PLAN:   Continue working toward increased first rib mobility and increased focus on cervical contributions to condition. Continue to address guarding distally as well.    SIGNATURE: Tyrone Acuña PTA, [place] License #: [id number]      Electronically Signed on 11/5/2024           [address] [place] [address]. [address]  [phone]

## 2024-11-07 ENCOUNTER — HOSPITAL ENCOUNTER (OUTPATIENT)
Dept: PHYSICAL THERAPY | Facility: HOSPITAL | Age: 64
Setting detail: THERAPIES SERIES
Discharge: HOME | End: 2024-11-07
Payer: OTHER GOVERNMENT

## 2024-11-07 DIAGNOSIS — M54.12 RADICULOPATHY, CERVICAL: ICD-10-CM

## 2024-11-07 DIAGNOSIS — G89.29 CHRONIC RIGHT SHOULDER PAIN: ICD-10-CM

## 2024-11-07 DIAGNOSIS — M25.512 LEFT SHOULDER PAIN, UNSPECIFIED CHRONICITY: ICD-10-CM

## 2024-11-07 DIAGNOSIS — M25.512 CHRONIC LEFT SHOULDER PAIN: ICD-10-CM

## 2024-11-07 DIAGNOSIS — M25.812 IMPINGEMENT OF LEFT SHOULDER: Primary | ICD-10-CM

## 2024-11-07 DIAGNOSIS — M25.511 CHRONIC RIGHT SHOULDER PAIN: ICD-10-CM

## 2024-11-07 DIAGNOSIS — G89.29 CHRONIC LEFT SHOULDER PAIN: ICD-10-CM

## 2024-11-07 PROCEDURE — 97140 MANUAL THERAPY 1/> REGIONS: CPT

## 2024-11-07 PROCEDURE — 97110 THERAPEUTIC EXERCISES: CPT

## 2024-11-07 NOTE — THERAPY TREATMENT NOTE
Physical Therapy Treatment Note  [facility] Therapy [facility]  A [address], [place] [address]    Patient: Red Carver                                                 Visit Date: 2024  :     1960    Referring practitioner:    AME Wells*  Date of Initial Visit:          Type: THERAPY  Episode: L Shoulder impingement; R Shoulder Pain; R Cx radiculopathy    Visit Diagnoses:    ICD-10-CM ICD-9-CM   1. Impingement of left shoulder  M25.812 719.81   2. Chronic left shoulder pain  M25.512 719.41    G89.29 338.29   3. Chronic right shoulder pain  M25.511 719.41    G89.29 338.29   4. Radiculopathy, cervical  M54.12 723.4   5. Left shoulder pain, unspecified chronicity  M25.512 719.41         SUBJECTIVE     Subjective: He's getting better and getting stronger. Things are getting better, and this is working. He just has some tightness when sitting there, though has some pain with movement       PAIN: 0/10 sitting; 4/10 when moving certain ways     OBJECTIVE     Objective     Therapeutic Exercises    86405 Units Comments   ITYW over ball + chin tuck  2x10 ea     3 part progressive pec stretch on pink roller      HL bolster angels BUE in ER                Timed Minutes 26       Manual Therapy     51241  Comments   Cervical manual T/D     STM to B UT/LS/Cx dain/    TPR to B UT/LS TP in UT reproduced pain down RUE   B UT/LS stretches    Timed Minutes 15          Therapy Education/Self Care 93651   Education offered today    Medbride Code    Ongoing HEP   Access Code: [email or web address]  URL: [email or web address]  Date: 2024  Prepared by: Elena Gill    Exercises  - Supine Shoulder Flexion AAROM with Hands Clasped  - 1 x daily - 7 x weekly - 3 sets - 10 reps  - Standing Shoulder Abduction AAROM with Dowel  - 1 x daily - 7 x weekly - 3 sets - 10 reps  - Seated Scapular Retraction  - 1 x daily - 7 x weekly - 3 sets - 10 reps - 5  hold  - Corner Pec Major Stretch  - 1 x daily - 7 x weekly - 3 reps - 30-60 sec hold  - Seated Shoulder Flexion with Resistance  - 1 x daily - 7 x weekly - 2 sets - 10 reps  - Seated Shoulder Abduction with Resistance  - 1 x daily - 7 x weekly - 2 sets - 10 reps  - Seated Single Arm Shoulder External Rotation with Self-Anchored Resistance  - 1 x daily - 7 x weekly - 2 sets - 10 reps  - First Rib Mobilization with Strap  - 1 x daily - 7 x weekly - 5 reps - 10-15 sec hold  Added on 10/8:  - Ulnar Nerve Flossing  - 1 x daily - 7 x weekly - 3 sets - 10 reps  - Median Nerve Flossing  - 1 x daily - 7 x weekly - 3 sets - 10 reps  - Radial Nerve Flossing  - 1 x daily - 7 x weekly - 3 sets - 10 reps   Timed Minutes        Total Timed Treatment:     41   mins  Total Time of Visit:             41   mins         ASSESSMENT/PLAN     GOALS  Goals                                                    Progress Note due by 11/16/24                                                                Recert due by 12/18/24   STG by: 4 weeks Comments Date Status   Decrease subjective pain to 2/10 5-6/10 neck  10/17  Progressing   Decreased muscle guarding  throughout shoulder girdle Greatly decreased muscle guarding, noting he feels that it is softer as well.  10/24  ongoing   LTG by: 8 weeks         Symmetrical valerie shoulder flexion and abduction to at least 150 deg actively to improve ability to reach into overhead cabinets Flexion R 164 L 178 degrees, abduction R 165, L 160 degrees  9/20  Met   Able to resume household and work activities without exacerbation of symptoms Washing his back is the hardest 10/17 ongoing   Gross shoulder MMT at least 4+/5 to improve ability to lift items and groceries at home  See table 9/20 Met   Understands improved ergonomics for work and HEP for flexibility and stability Performs daily  10/17 ongoing   Improve QuickDASH score to 20 or less for R  56.82 R shoulder 10/17 progressing   Reports no pain except  occasional minor twinges no more than 2/10 See STG#1 10/17 ongoing   Reports eliminated R UE radicular symptoms for 1 week Reports constant 10/17 Ongoing   Be independent with HEP for L shoulder and R UE radicular pain  performs daily 10/17 Ongoing   Improve cervical rotation to 65 degrees bilaterally with report of no pain  R 56 L 56 degrees 10/17 Ongoing       Assessment/Plan     ASSESSMENT: Pt Continues to have decreased pain, and none with rest other than tightness. He did well with postural mm training today, though fatigued easily with these and required cues to correct compensations with activity. TP noted in R LS reproduced pain down RUE.       PLAN:   Continue working toward increased first rib mobility and increased focus on cervical contributions to condition. Continue to address guarding distally as well.    SIGNATURE: Krystyna Mccormack PT DPT, [place] License #: [id number]        Electronically Signed on 11/7/2024           [address] [place] [address] [place]. [address]  [phone]

## 2024-11-12 ENCOUNTER — HOSPITAL ENCOUNTER (OUTPATIENT)
Dept: PHYSICAL THERAPY | Facility: HOSPITAL | Age: 64
Setting detail: THERAPIES SERIES
Discharge: HOME | End: 2024-11-12
Payer: OTHER GOVERNMENT

## 2024-11-12 DIAGNOSIS — M25.512 LEFT SHOULDER PAIN, UNSPECIFIED CHRONICITY: ICD-10-CM

## 2024-11-12 DIAGNOSIS — G89.29 CHRONIC RIGHT SHOULDER PAIN: ICD-10-CM

## 2024-11-12 DIAGNOSIS — M25.812 IMPINGEMENT OF LEFT SHOULDER: Primary | ICD-10-CM

## 2024-11-12 DIAGNOSIS — M54.12 RADICULOPATHY, CERVICAL: ICD-10-CM

## 2024-11-12 DIAGNOSIS — M25.512 CHRONIC LEFT SHOULDER PAIN: ICD-10-CM

## 2024-11-12 DIAGNOSIS — G89.29 CHRONIC LEFT SHOULDER PAIN: ICD-10-CM

## 2024-11-12 DIAGNOSIS — M25.511 CHRONIC RIGHT SHOULDER PAIN: ICD-10-CM

## 2024-11-12 PROCEDURE — 97110 THERAPEUTIC EXERCISES: CPT

## 2024-11-12 PROCEDURE — 97140 MANUAL THERAPY 1/> REGIONS: CPT

## 2024-11-12 NOTE — THERAPY TREATMENT NOTE
Physical Therapy Treatment Note  [facility] Therapy [facility]  A [address] [address]    Patient: Red Carver                                                 Visit Date: 2024  :     1960    Referring practitioner:    AME Wells*  Date of Initial Visit:          Type: THERAPY  Episode: L Shoulder impingement; R Shoulder Pain; R Cx radiculopathy  Number of visits this episode: 29    Visit Diagnoses:    ICD-10-CM ICD-9-CM   1. Impingement of left shoulder  M25.812 719.81   2. Chronic left shoulder pain  M25.512 719.41    G89.29 338.29   3. Chronic right shoulder pain  M25.511 719.41    G89.29 338.29   4. Radiculopathy, cervical  M54.12 723.4   5. Left shoulder pain, unspecified chronicity  M25.512 719.41     SUBJECTIVE     Subjective: still reports numbness in his arms and having some pain in his neck. He feels therapy is helping.      PAIN: 3/10         OBJECTIVE     Objective     Manual Therapy     03364  Comments   Manual Traction     Cervical suboccipital releases Grade 2 sustained    Manual traction B lateral bend Grade 2 sustained    Upper thoracic stretching [id number]        Timed Minutes 15         Therapeutic Exercises    66234 Units Comments   Bilateral Unilateral sarates punches 3# x15     Bilateral Unilateral Tripod row 3# x10     Inclined prone Rear delt fly x20     Seated No monies RTB 2x10     Seated rows RTB 2X10     Timed Minutes 25        Therapy Education/Self Care 72227   Education offered today    Medbride Code    Ongoing HEP   Access Code: [email or web address]  URL: [email or web address]  Date: 2024  Prepared by: Elena Gill     Exercises  - Supine Shoulder Flexion AAROM with Hands Clasped  - 1 x daily - 7 x weekly - 3 sets - 10 reps  - Standing Shoulder Abduction AAROM with Dowel  - 1 x daily - 7 x weekly - 3 sets - 10 reps  - Seated Scapular Retraction  - 1 x daily - 7 x weekly - 3 sets - 10  reps - 5 hold  - Corner Pec Major Stretch  - 1 x daily - 7 x weekly - 3 reps - 30-60 sec hold  - Seated Shoulder Flexion with Resistance  - 1 x daily - 7 x weekly - 2 sets - 10 reps  - Seated Shoulder Abduction with Resistance  - 1 x daily - 7 x weekly - 2 sets - 10 reps  - Seated Single Arm Shoulder External Rotation with Self-Anchored Resistance  - 1 x daily - 7 x weekly - 2 sets - 10 reps  - First Rib Mobilization with Strap  - 1 x daily - 7 x weekly - 5 reps - 10-15 sec hold  Added on 10/8:  - Ulnar Nerve Flossing  - 1 x daily - 7 x weekly - 3 sets - 10 reps  - Median Nerve Flossing  - 1 x daily - 7 x weekly - 3 sets - 10 reps  - Radial Nerve Flossing  - 1 x daily - 7 x weekly - 3 sets - 10 reps   Timed Minutes        Total Timed Treatment:     40   mins  Total Time of Visit:             40   mins         ASSESSMENT/PLAN     GOALS  Goals                                                    Progress Note due by 11/16/24                                                                Recert due by 12/18/24   STG by: 4 weeks Comments Date Status   Decrease subjective pain to 2/10 5-6/10 neck  10/17  Progressing   Decreased muscle guarding  throughout shoulder girdle Greatly decreased muscle guarding, noting he feels that it is softer as well.  10/24  ongoing   LTG by: 8 weeks         Symmetrical valerie shoulder flexion and abduction to at least 150 deg actively to improve ability to reach into overhead cabinets Flexion R 164 L 178 degrees, abduction R 165, L 160 degrees  9/20  Met   Able to resume household and work activities without exacerbation of symptoms Washing his back is the hardest and still struggling with household chores  11/12 ongoing   Gross shoulder MMT at least 4+/5 to improve ability to lift items and groceries at home  See table 9/20 Met   Understands improved ergonomics for work and HEP for flexibility and stability Performs daily  10/17 ongoing   Improve QuickDASH score to 20 or less for R  56.82 R  shoulder 10/17 progressing   Reports no pain except occasional minor twinges no more than 2/10 See STG#1 10/17 ongoing   Reports eliminated R UE radicular symptoms for 1 week Reports constant stated that the pain is getting better but its more numbness and tingling the pain is just when he is performing certain movements on average his pain is staying at 3 over a 5 day magno  11/12 Ongoing   Be independent with HEP for L shoulder and R UE radicular pain  performs daily 10/17 Ongoing   Improve cervical rotation to 65 degrees bilaterally with report of no pain  R 56 L 56 degrees 10/17 Ongoing     Anticipated CPT codes: Therapeutic Exercise 91114, Manual Therapy 02708, Therapeutic Activity 25763, Neuromuscular ReEducation 82246, Self Care/Home Management 74171, Estim Attended 51517, and Estim Unattended 88014      Assessment/Plan     ASSESSMENT:   Pt feels as if he is getting better we continue to see a decrease in his pain level with each session as we continue to work on strengthening of the shoulders.     PLAN:   Review all goals for a progress note. Continue to work on strength and mobility of the UE.     SIGNATURE: Alec Bynum PTA, [place] License #: [id number]  Electronically Signed on 11/12/2024        [facility] [place] [address]  [phone]

## 2024-11-14 ENCOUNTER — HOSPITAL ENCOUNTER (OUTPATIENT)
Dept: PHYSICAL THERAPY | Facility: HOSPITAL | Age: 64
Setting detail: THERAPIES SERIES
Discharge: HOME | End: 2024-11-14
Payer: OTHER GOVERNMENT

## 2024-11-14 DIAGNOSIS — M54.12 RADICULOPATHY, CERVICAL: ICD-10-CM

## 2024-11-14 DIAGNOSIS — M25.512 CHRONIC LEFT SHOULDER PAIN: ICD-10-CM

## 2024-11-14 DIAGNOSIS — M25.812 IMPINGEMENT OF LEFT SHOULDER: Primary | ICD-10-CM

## 2024-11-14 DIAGNOSIS — M25.511 CHRONIC RIGHT SHOULDER PAIN: ICD-10-CM

## 2024-11-14 DIAGNOSIS — M25.512 LEFT SHOULDER PAIN, UNSPECIFIED CHRONICITY: ICD-10-CM

## 2024-11-14 DIAGNOSIS — G89.29 CHRONIC LEFT SHOULDER PAIN: ICD-10-CM

## 2024-11-14 DIAGNOSIS — G89.29 CHRONIC RIGHT SHOULDER PAIN: ICD-10-CM

## 2024-11-14 PROCEDURE — 97110 THERAPEUTIC EXERCISES: CPT

## 2024-11-14 PROCEDURE — 97530 THERAPEUTIC ACTIVITIES: CPT

## 2024-11-14 NOTE — THERAPY TREATMENT NOTE
Physical Therapy Treatment Note and 30 Day Progress Note  [facility] Outpatient Therapy Services  A department [address] [place] [address]    Patient: Red Carver                                                 Visit Date: 2024  :     1960    Referring practitioner:    AME Wells*  Date of Initial Visit:          Type: THERAPY  Episode: L Shoulder impingement; R Shoulder Pain; R Cx radiculopathy  Number of visits this episode: 30    Visit Diagnoses:    ICD-10-CM ICD-9-CM   1. Impingement of left shoulder  M25.812 719.81   2. Chronic right shoulder pain  M25.511 719.41    G89.29 338.29   3. Left shoulder pain, unspecified chronicity  M25.512 719.41   4. Radiculopathy, cervical  M54.12 723.4   5. Chronic left shoulder pain  M25.512 719.41    G89.29 338.29       SUBJECTIVE     Subjective: He says he feels alright, and thinks strengthening of the shoulder is what's needed. He thinks he's 70% better since he started PT.      PAIN: 3/10 with movement of the arm          OBJECTIVE     Objective     Therapeutic Activities    03115 Comments   All goals assessed for PN    Timed Minutes 10        Therapeutic Exercises    01810 Units Comments   Supine Serratus Anterior Punches 2x10 3#   Seated Resisted ER B 2x10 Red theraband   ITY on ball + chin tuck 2x10 each    Bilateral unweighted arm with head turns 3 min each direction Up/down  Left and right rotation   Timed Minutes 30         Therapy Education/Self Care 96482   Education offered today    [employer] Code [email or web address]   Ongoing HEP   Access Code: [email or web address]  URL: [email or web address]  Date: 2024  Prepared by: Elena Gill     Exercises  - Supine Shoulder Flexion AAROM with Hands Clasped  - 1 x daily - 7 x weekly - 3 sets - 10 reps  - Standing Shoulder Abduction AAROM with Dowel  - 1 x daily - 7 x weekly - 3 sets - 10 reps  - Seated Scapular Retraction  - 1 x daily - 7 x weekly - 3 sets - 10  reps - 5 hold  - Corner Pec Major Stretch  - 1 x daily - 7 x weekly - 3 reps - 30-60 sec hold  - Seated Shoulder Flexion with Resistance  - 1 x daily - 7 x weekly - 2 sets - 10 reps  - Seated Shoulder Abduction with Resistance  - 1 x daily - 7 x weekly - 2 sets - 10 reps  - Seated Single Arm Shoulder External Rotation with Self-Anchored Resistance  - 1 x daily - 7 x weekly - 2 sets - 10 reps  - First Rib Mobilization with Strap  - 1 x daily - 7 x weekly - 5 reps - 10-15 sec hold  Added on 10/8:  - Ulnar Nerve Flossing  - 1 x daily - 7 x weekly - 3 sets - 10 reps  - Median Nerve Flossing  - 1 x daily - 7 x weekly - 3 sets - 10 reps  - Radial Nerve Flossing  - 1 x daily - 7 x weekly - 3 sets - 10 reps   Timed Minutes        Total Timed Treatment:     40   mins  Total Time of Visit:             40   mins         ASSESSMENT/PLAN     GOALS   Goals                                                    Progress Note due by 12/14/24                                                                Recert due by 12/18/24   STG by: 4 weeks Comments Date Status   Decrease subjective pain to 2/10 11/14: 3/10 11/14  Progressing   Decreased muscle guarding  throughout shoulder girdle 11/14: Decreased muscle guarding of the shoulder 11/14  MET   LTG by: 8 weeks         Symmetrical valerie shoulder flexion and abduction to at least 150 deg actively to improve ability to reach into overhead cabinets Flexion R 164 L 178 degrees, abduction R 165, L 160 degrees  9/20  Met   Able to resume household and work activities without exacerbation of symptoms 11/14: still struggling to carry heavy items with his right arm and still unable to wash his back 11/14 ongoing   Gross shoulder MMT at least 4+/5 to improve ability to lift items and groceries at home  See table 9/20 Met   Understands improved ergonomics for work and HEP for flexibility and stability Performs daily  11/14 ongoing   Improve QuickDASH score to 20 or less for R  11/14: 54.55 R  shoulder 11/14 progressing   Reports no pain except occasional minor twinges no more than 2/10 See STG#1 11/14 ongoing   Reports eliminated R UE radicular symptoms for 1 week 11/14: He states that the tingling and numbness doesn't seem to go away.  11/14 Ongoing   Be independent with HEP for L shoulder and R UE radicular pain  performs daily 11/14 Ongoing   Improve cervical rotation to 65 degrees bilaterally with report of no pain 11/14: L 56, R 48 with pain   After treatment L 58 R 56 11/14 Ongoing     Anticipated CPT codes: Therapeutic Exercise 14855, Manual Therapy 12999, Therapeutic Activity 80906, Neuromuscular ReEducation 74052, Self Care/Home Management 27006, Estim Attended 35513, and Estim Unattended 81103    Assessment/Plan     ASSESSMENT:   Impairments: abnormal muscle firing, abnormal or restricted ROM, activity intolerance, impaired physical strength, lacks appropriate home exercise program and pain with function   Functional limitations: carrying objects, lifting, pulling, pushing, uncomfortable because of pain, reaching behind back, reaching overhead and unable to perform repetitive tasks   Assessment details: All goals were assessed for progress note today with 1 of 2 short-term goals being met and 2 of 9 long-term goals being met. Patient has shown improvement with shoulder ROM and still struggles with shoulder strength particularly with more functional activities and like washing his back due to limited shoulder ER specifically. Patient continues to experience some numbness and tingling down into his R hand which is sometimes increased specifically with overhead activity. Patient will continue to benefit from skilled PT treatment to decrease pain and increase shoulder and cervical ROM.    PLAN:   Therapy options: will be seen for skilled therapy services  Planned modality interventions: cryotherapy, dry needling, low level laser therapy, iontophoresis, TENS, electrical stimulation/Russian  stimulation, high voltage pulsed current (pain management), high voltage pulsed current (spasm management), hydrotherapy and ultrasound  Planned therapy interventions: ADL retraining, body mechanics training, fine motor coordination training, flexibility, functional ROM exercises, home exercise program, IADL retraining, joint mobilization, manual therapy, motor coordination training, neuromuscular re-education, postural training, soft tissue mobilization, strengthening, stretching and therapeutic activities  Frequency: 2x week  Duration in weeks: 8  Treatment plan discussed with: patient  Plan details: Continue to work on strength and mobility of the shoulders and progress these as tolerated. Consider performing Radhames Test.     SIGNATURE: Jessie Shah PT Student, [place] License #:   Electronically Signed on 11/14/2024    The clinical instructor and/or supervising staff, Krystyna Mccormack PT DPT, was present in clinic guiding the visit by approving, concurring, and confirming the skilled judgement for all services rendered.    Signature:  Krystyna Mccormack PT DPT, [place] License #: [id number]    Electronically signed on 11/14/2024        [address] [place] [address] [place]  [place] [address]  [phone]

## 2024-11-19 ENCOUNTER — HOSPITAL ENCOUNTER (OUTPATIENT)
Dept: PHYSICAL THERAPY | Facility: HOSPITAL | Age: 64
Setting detail: THERAPIES SERIES
Discharge: HOME | End: 2024-11-19
Payer: OTHER GOVERNMENT

## 2024-11-19 DIAGNOSIS — M25.512 LEFT SHOULDER PAIN, UNSPECIFIED CHRONICITY: ICD-10-CM

## 2024-11-19 DIAGNOSIS — G89.29 CHRONIC LEFT SHOULDER PAIN: ICD-10-CM

## 2024-11-19 DIAGNOSIS — M25.511 CHRONIC RIGHT SHOULDER PAIN: ICD-10-CM

## 2024-11-19 DIAGNOSIS — M25.512 CHRONIC LEFT SHOULDER PAIN: ICD-10-CM

## 2024-11-19 DIAGNOSIS — G89.29 CHRONIC RIGHT SHOULDER PAIN: ICD-10-CM

## 2024-11-19 DIAGNOSIS — M25.812 IMPINGEMENT OF LEFT SHOULDER: Primary | ICD-10-CM

## 2024-11-19 DIAGNOSIS — M54.12 RADICULOPATHY, CERVICAL: ICD-10-CM

## 2024-11-19 PROCEDURE — 97140 MANUAL THERAPY 1/> REGIONS: CPT

## 2024-11-19 PROCEDURE — 97110 THERAPEUTIC EXERCISES: CPT

## 2024-11-19 NOTE — THERAPY TREATMENT NOTE
Physical Therapy Treatment Note  [facility]  A department [address] [address]    Patient: Red Carver                                                 Visit Date: 2024  :     1960    Referring practitioner:    AME Wells*  Date of Initial Visit:          Type: THERAPY  Episode: L Shoulder impingement; R Shoulder Pain; R Cx radiculopathy  Number of visits this episode: 31    Visit Diagnoses:    ICD-10-CM ICD-9-CM   1. Impingement of left shoulder  M25.812 719.81   2. Chronic right shoulder pain  M25.511 719.41    G89.29 338.29   3. Left shoulder pain, unspecified chronicity  M25.512 719.41   4. Radiculopathy, cervical  M54.12 723.4   5. Chronic left shoulder pain  M25.512 719.41    G89.29 338.29       SUBJECTIVE     Subjective: States he still has some numbness in his R arm, but feels like he is getting stronger. Notes the R side of his neck does not feel as tight as before.      PAIN: 3/10 with movement of the arm      OBJECTIVE     Objective     Therapeutic Exercises    78743 Units Comments   Lat pulldown @ cybex L3 x20    Straight arm pulldown @ cybex L3 x15    Scapular depression @ cybex L3 X15 ea    Sh ER ISO walkouts w/ RTB X10 ea    Sh EXT ISO walkouts w/ RTB X10 ea    Tricep press @ cybex L2.5 x15    Bicep curl @ cybex L2 x15              Timed Minutes 28       Manual Therapy     87286  Comments   B infraspinatus release    STM to B UT/LS    IASTM w/ Powerboost L1 ball to B UT/LS/Tx dain            Timed Minutes 14       Therapy Education/Self Care 02955   Education offered today    Halle Code [email or web address]   Ongoing HEP   Access Code: [email or web address]  URL: [email or web address]  Date: 2024  Prepared by: Elena Gill     Exercises  - Supine Shoulder Flexion AAROM with Hands Clasped  - 1 x daily - 7 x weekly - 3 sets - 10 reps  - Standing Shoulder Abduction AAROM with Dowel  - 1 x daily - 7 x  weekly - 3 sets - 10 reps  - Seated Scapular Retraction  - 1 x daily - 7 x weekly - 3 sets - 10 reps - 5 hold  - Corner Pec Major Stretch  - 1 x daily - 7 x weekly - 3 reps - 30-60 sec hold  - Seated Shoulder Flexion with Resistance  - 1 x daily - 7 x weekly - 2 sets - 10 reps  - Seated Shoulder Abduction with Resistance  - 1 x daily - 7 x weekly - 2 sets - 10 reps  - Seated Single Arm Shoulder External Rotation with Self-Anchored Resistance  - 1 x daily - 7 x weekly - 2 sets - 10 reps  - First Rib Mobilization with Strap  - 1 x daily - 7 x weekly - 5 reps - 10-15 sec hold  Added on 10/8:  - Ulnar Nerve Flossing  - 1 x daily - 7 x weekly - 3 sets - 10 reps  - Median Nerve Flossing  - 1 x daily - 7 x weekly - 3 sets - 10 reps  - Radial Nerve Flossing  - 1 x daily - 7 x weekly - 3 sets - 10 reps   Timed Minutes        Total Timed Treatment:     42   mins  Total Time of Visit:             42   mins         ASSESSMENT/PLAN     GOALS   Goals                                                    Progress Note due by 12/14/24                                                                Recert due by 12/18/24   STG by: 4 weeks Comments Date Status   Decrease subjective pain to 2/10 11/14: 3/10 11/14  Progressing   Decreased muscle guarding  throughout shoulder girdle 11/14: Decreased muscle guarding of the shoulder 11/14  MET   LTG by: 8 weeks         Symmetrical valerie shoulder flexion and abduction to at least 150 deg actively to improve ability to reach into overhead cabinets Flexion R 164 L 178 degrees, abduction R 165, L 160 degrees  9/20  Met   Able to resume household and work activities without exacerbation of symptoms 11/14: still struggling to carry heavy items with his right arm and still unable to wash his back 11/14 ongoing   Gross shoulder MMT at least 4+/5 to improve ability to lift items and groceries at home  See table 9/20 Met   Understands improved ergonomics for work and HEP for flexibility and stability  Performs daily  11/14 ongoing   Improve QuickDASH score to 20 or less for R  11/14: 54.55 R shoulder 11/14 progressing   Reports no pain except occasional minor twinges no more than 2/10 See STG#1 11/14 ongoing   Reports eliminated R UE radicular symptoms for 1 week 11/14: He states that the tingling and numbness doesn't seem to go away.  11/14 Ongoing   Be independent with HEP for L shoulder and R UE radicular pain  performs daily 11/14 Ongoing   Improve cervical rotation to 65 degrees bilaterally with report of no pain 11/14: L 56, R 48 with pain   After treatment L 58 R 56 11/14 Ongoing     Anticipated CPT codes: Therapeutic Exercise 26757, Manual Therapy 53235, Therapeutic Activity 90546, Neuromuscular ReEducation 31748, Self Care/Home Management 07929, Estim Attended 01258, and Estim Unattended 77975    Assessment/Plan     ASSESSMENT:   Pt reported he still has a little rad s/s in his RUE, noting his strength is slowly improving but still gets tired if he overdoes it. He noted no discomfort during exercises, but did take few short rest breaks throughout. He presented with some tightness in his B LS/infraspinatus regions, but had decreased tightness following treatment.     PLAN:   Continue to work on strength and mobility of the shoulders and progress these as tolerated. Consider performing Radhames Test.     Signature:  Tyrone Acuña PTA, [place] License #: [id number]  Electronically signed on 11/19/2024        [address]  [place] [address]  [phone]

## 2024-11-21 ENCOUNTER — HOSPITAL ENCOUNTER (OUTPATIENT)
Dept: PHYSICAL THERAPY | Facility: HOSPITAL | Age: 64
Setting detail: THERAPIES SERIES
Discharge: HOME | End: 2024-11-21
Payer: OTHER GOVERNMENT

## 2024-11-21 DIAGNOSIS — G89.29 CHRONIC LEFT SHOULDER PAIN: ICD-10-CM

## 2024-11-21 DIAGNOSIS — G89.29 CHRONIC RIGHT SHOULDER PAIN: ICD-10-CM

## 2024-11-21 DIAGNOSIS — M25.512 CHRONIC LEFT SHOULDER PAIN: ICD-10-CM

## 2024-11-21 DIAGNOSIS — M54.12 RADICULOPATHY, CERVICAL: ICD-10-CM

## 2024-11-21 DIAGNOSIS — M25.512 LEFT SHOULDER PAIN, UNSPECIFIED CHRONICITY: ICD-10-CM

## 2024-11-21 DIAGNOSIS — M25.511 CHRONIC RIGHT SHOULDER PAIN: ICD-10-CM

## 2024-11-21 DIAGNOSIS — M25.812 IMPINGEMENT OF LEFT SHOULDER: Primary | ICD-10-CM

## 2024-11-21 PROCEDURE — 97110 THERAPEUTIC EXERCISES: CPT

## 2024-11-21 PROCEDURE — 97140 MANUAL THERAPY 1/> REGIONS: CPT

## 2024-11-21 PROCEDURE — 97530 THERAPEUTIC ACTIVITIES: CPT

## 2024-11-21 NOTE — THERAPY TREATMENT NOTE
Physical Therapy Treatment Note  [facility]  A department [address] [address]    Patient: Red Carver                                                 Visit Date: 2024  :     1960    Referring practitioner:    AME Wells*  Date of Initial Visit:          Type: THERAPY  Episode: L Shoulder impingement; R Shoulder Pain; R Cx radiculopathy  Number of visits this episode: 32    Visit Diagnoses:    ICD-10-CM ICD-9-CM   1. Impingement of left shoulder  M25.812 719.81   2. Chronic right shoulder pain  M25.511 719.41    G89.29 338.29   3. Left shoulder pain, unspecified chronicity  M25.512 719.41   4. Radiculopathy, cervical  M54.12 723.4   5. Chronic left shoulder pain  M25.512 719.41    G89.29 338.29       SUBJECTIVE     Subjective: He brings in his own TENS unit to show. He feels about the same which is positive. No pain at rest, just a little tight. He still has the numbness in the arm.     PAIN: 3/10 with movement of the arm      OBJECTIVE     Objective     Therapeutic Activities    15125 Comments   Radhames test  1:13 pain in tricep and forearm tightening on R, 0:50 fatigue in latt; everything just tighten up but no tingling    Weight taps on shelves, one arm at a time, 2 lbs then 3 lbs  Waist, chest, then overhead    B lateral weight transfers on OH shelf             Timed Minutes 15      Therapeutic Exercises    35194 Units Comments   B unilateral bent over rows w/ 5 lbs  2x10     Seated OH press w/ 5 lbs   2x10     B standing latt diagonals at cybex   2x10               Timed Minutes 10       Manual Therapy     32515  Comments   STM to B UT/LS    Thoracic PA mobs and side glides             Timed Minutes 15     Therapy Education/Self Care 91743   Education offered today    Halle Code [email or web address]   Ongoing HEP   Access Code: [email or web address]  URL: [email or web address]  Date: 2024  Prepared by: Elena  Gill     Exercises  - Supine Shoulder Flexion AAROM with Hands Clasped  - 1 x daily - 7 x weekly - 3 sets - 10 reps  - Standing Shoulder Abduction AAROM with Dowel  - 1 x daily - 7 x weekly - 3 sets - 10 reps  - Seated Scapular Retraction  - 1 x daily - 7 x weekly - 3 sets - 10 reps - 5 hold  - Corner Pec Major Stretch  - 1 x daily - 7 x weekly - 3 reps - 30-60 sec hold  - Seated Shoulder Flexion with Resistance  - 1 x daily - 7 x weekly - 2 sets - 10 reps  - Seated Shoulder Abduction with Resistance  - 1 x daily - 7 x weekly - 2 sets - 10 reps  - Seated Single Arm Shoulder External Rotation with Self-Anchored Resistance  - 1 x daily - 7 x weekly - 2 sets - 10 reps  - First Rib Mobilization with Strap  - 1 x daily - 7 x weekly - 5 reps - 10-15 sec hold  Added on 10/8:  - Ulnar Nerve Flossing  - 1 x daily - 7 x weekly - 3 sets - 10 reps  - Median Nerve Flossing  - 1 x daily - 7 x weekly - 3 sets - 10 reps  - Radial Nerve Flossing  - 1 x daily - 7 x weekly - 3 sets - 10 reps   Timed Minutes        Total Timed Treatment:     40   mins  Total Time of Visit:             40   mins         ASSESSMENT/PLAN     GOALS   Goals                                                    Progress Note due by 12/14/24                                                                Recert due by 12/18/24   STG by: 4 weeks Comments Date Status   Decrease subjective pain to 2/10 11/14: 3/10 11/14  Progressing   Decreased muscle guarding  throughout shoulder girdle 11/14: Decreased muscle guarding of the shoulder 11/14  MET   LTG by: 8 weeks         Symmetrical valerie shoulder flexion and abduction to at least 150 deg actively to improve ability to reach into overhead cabinets Flexion R 164 L 178 degrees, abduction R 165, L 160 degrees  9/20  Met   Able to resume household and work activities without exacerbation of symptoms 11/14: still struggling to carry heavy items with his right arm and still unable to wash his back 11/14 ongoing   Gross  shoulder MMT at least 4+/5 to improve ability to lift items and groceries at home  See table 9/20 Met   Understands improved ergonomics for work and HEP for flexibility and stability Performs daily  11/14 ongoing   Improve QuickDASH score to 20 or less for R  11/14: 54.55 R shoulder 11/14 progressing   Reports no pain except occasional minor twinges no more than 2/10 See STG#1 11/14 ongoing   Reports eliminated R UE radicular symptoms for 1 week 11/14: He states that the tingling and numbness doesn't seem to go away.  11/14 Ongoing   Be independent with HEP for L shoulder and R UE radicular pain  performs daily 11/14 Ongoing   Improve cervical rotation to 65 degrees bilaterally with report of no pain 11/14: L 56, R 48 with pain   After treatment L 58 R 56 11/14 Ongoing     Anticipated CPT codes: Therapeutic Exercise 56991, Manual Therapy 10176, Therapeutic Activity 36526, Neuromuscular ReEducation 97001, Self Care/Home Management 73298, Estim Attended 65445, and Estim Unattended 32604    Assessment/Plan     ASSESSMENT:   We began session with a Radhames test which he was able to complete with only complaints of tightness but denies and N/T increase. We then worked on simulating functional OH tasks which, again he had tightness, but no increase in radicular symptoms. We focused on gym style strengthening. During OH presses, he reported his L was struggling. Concluded session with manual work to decrease cervical strain which he responded positively to.     PLAN:   Continue to work on strength and mobility of the shoulders and progress these as tolerated.     Signature:  Elena Gill PTA, [place] License #: [id number]  Electronically signed on 11/21/2024        [address] [place] [address]. [address]  [phone]

## 2024-12-03 ENCOUNTER — HOSPITAL ENCOUNTER (OUTPATIENT)
Dept: PHYSICAL THERAPY | Facility: HOSPITAL | Age: 64
Setting detail: THERAPIES SERIES
Discharge: HOME | End: 2024-12-03
Payer: OTHER GOVERNMENT

## 2024-12-03 DIAGNOSIS — M25.812 IMPINGEMENT OF LEFT SHOULDER: Primary | ICD-10-CM

## 2024-12-03 DIAGNOSIS — M25.512 CHRONIC LEFT SHOULDER PAIN: ICD-10-CM

## 2024-12-03 DIAGNOSIS — M25.511 CHRONIC RIGHT SHOULDER PAIN: ICD-10-CM

## 2024-12-03 DIAGNOSIS — G89.29 CHRONIC LEFT SHOULDER PAIN: ICD-10-CM

## 2024-12-03 DIAGNOSIS — M25.512 LEFT SHOULDER PAIN, UNSPECIFIED CHRONICITY: ICD-10-CM

## 2024-12-03 DIAGNOSIS — G89.29 CHRONIC RIGHT SHOULDER PAIN: ICD-10-CM

## 2024-12-03 DIAGNOSIS — M54.12 RADICULOPATHY, CERVICAL: ICD-10-CM

## 2024-12-03 PROCEDURE — 97110 THERAPEUTIC EXERCISES: CPT

## 2024-12-03 PROCEDURE — 97140 MANUAL THERAPY 1/> REGIONS: CPT

## 2024-12-03 NOTE — THERAPY TREATMENT NOTE
Physical Therapy Treatment Note  [facility]  A department [address], [place] [address]    Patient: Red Carver                                                 Visit Date: 12/3/2024  :     1960    Referring practitioner:    AME Wells*  Date of Initial Visit:          Type: THERAPY  Episode: L Shoulder impingement; R Shoulder Pain; R Cx radiculopathy  Number of visits this episode: 33    Visit Diagnoses:    ICD-10-CM ICD-9-CM   1. Impingement of left shoulder  M25.812 719.81   2. Chronic right shoulder pain  M25.511 719.41    G89.29 338.29   3. Left shoulder pain, unspecified chronicity  M25.512 719.41   4. Radiculopathy, cervical  M54.12 723.4   5. Chronic left shoulder pain  M25.512 719.41    G89.29 338.29       SUBJECTIVE     Subjective: This time of the year he gets stiff and tight due to cold. He's been looking forward to today after a week off. He's been working hard at home with weight lifting and was sore so he backed off. He's been doing a lot of stretching. He'll go to the doctor on Friday for follow up.     PAIN: 3/10 with movement of the arm      OBJECTIVE     Objective      Therapeutic Exercises    37253 Units Comments   B unilateral standing ER with shoulder at 90 deg with 3# weight   2x10    Seated eccentric unilateral rows at cybex   2x5 each     B kneeling unilateral latt pulls                          Timed Minutes 25       Manual Therapy     02099  Comments   STM to B UT/LS    IASTM w/ blue ridged roller to thoracic     Thoracic PA mobs and side glides     Cervical manual distraction with bilateral side bends         Timed Minutes 30     Therapy Education/Self Care [id number]   Education offered today    Medsilvae Code [email or web address]   Ongoing HEP   Access Code: [email or web address]  URL: [email or web address]  Date: 2024  Prepared by: Elena Gill     Exercises  - Supine Shoulder Flexion AAROM with Hands Clasped   - 1 x daily - 7 x weekly - 3 sets - 10 reps  - Standing Shoulder Abduction AAROM with Dowel  - 1 x daily - 7 x weekly - 3 sets - 10 reps  - Seated Scapular Retraction  - 1 x daily - 7 x weekly - 3 sets - 10 reps - 5 hold  - Corner Pec Major Stretch  - 1 x daily - 7 x weekly - 3 reps - 30-60 sec hold  - Seated Shoulder Flexion with Resistance  - 1 x daily - 7 x weekly - 2 sets - 10 reps  - Seated Shoulder Abduction with Resistance  - 1 x daily - 7 x weekly - 2 sets - 10 reps  - Seated Single Arm Shoulder External Rotation with Self-Anchored Resistance  - 1 x daily - 7 x weekly - 2 sets - 10 reps  - First Rib Mobilization with Strap  - 1 x daily - 7 x weekly - 5 reps - 10-15 sec hold  Added on 10/8:  - Ulnar Nerve Flossing  - 1 x daily - 7 x weekly - 3 sets - 10 reps  - Median Nerve Flossing  - 1 x daily - 7 x weekly - 3 sets - 10 reps  - Radial Nerve Flossing  - 1 x daily - 7 x weekly - 3 sets - 10 reps   Timed Minutes        Total Timed Treatment:     55   mins  Total Time of Visit:             55   mins         ASSESSMENT/PLAN     GOALS   Goals                                                    Progress Note due by 12/14/24                                                                Recert due by 12/18/24   STG by: 4 weeks Comments Date Status   Decrease subjective pain to 2/10 11/14: 3/10 11/14  Progressing   Decreased muscle guarding  throughout shoulder girdle 11/14: Decreased muscle guarding of the shoulder 11/14  MET   LTG by: 8 weeks         Symmetrical valerie shoulder flexion and abduction to at least 150 deg actively to improve ability to reach into overhead cabinets Flexion R 164 L 178 degrees, abduction R 165, L 160 degrees  9/20  Met   Able to resume household and work activities without exacerbation of symptoms 11/14: still struggling to carry heavy items with his right arm and still unable to wash his back 11/14 ongoing   Gross shoulder MMT at least 4+/5 to improve ability to lift items and groceries  at home  See table 9/20 Met   Understands improved ergonomics for work and HEP for flexibility and stability Performs daily  11/14 ongoing   Improve QuickDASH score to 20 or less for R  11/14: 54.55 R shoulder 11/14 progressing   Reports no pain except occasional minor twinges no more than 2/10 See STG#1 11/14 ongoing   Reports eliminated R UE radicular symptoms for 1 week 11/14: He states that the tingling and numbness doesn't seem to go away.  11/14 Ongoing   Be independent with HEP for L shoulder and R UE radicular pain  performs daily 11/14 Ongoing   Improve cervical rotation to 65 degrees bilaterally with report of no pain 11/14: L 56, R 48 with pain   After treatment L 58 R 56 11/14 Ongoing     Anticipated CPT codes: Therapeutic Exercise 61331, Manual Therapy 33697, Therapeutic Activity 41125, Neuromuscular ReEducation 99651, Self Care/Home Management 97258, Estim Attended 57795, and Estim Unattended 00729    Assessment/Plan     ASSESSMENT:   We continued to challenge his B shoulder stability and endurance. He was very persistent although some exercises were difficult. Extra time was allotted to focus on decreasing strain in his cervical region and improve mobility which he responded positively to.     PLAN:   Continue to work on strength and mobility of the shoulders and progress these as tolerated.     Signature:  Elena Gill PTA, [place] License #: [id number]  Electronically signed on 12/3/2024        [address] [place] [address]. [address]  [phone]

## 2024-12-05 ENCOUNTER — HOSPITAL ENCOUNTER (OUTPATIENT)
Dept: PHYSICAL THERAPY | Facility: HOSPITAL | Age: 64
Setting detail: THERAPIES SERIES
Discharge: HOME | End: 2024-12-05
Payer: OTHER GOVERNMENT

## 2024-12-05 DIAGNOSIS — M25.812 IMPINGEMENT OF LEFT SHOULDER: Primary | ICD-10-CM

## 2024-12-05 DIAGNOSIS — M25.511 CHRONIC RIGHT SHOULDER PAIN: ICD-10-CM

## 2024-12-05 DIAGNOSIS — G89.29 CHRONIC RIGHT SHOULDER PAIN: ICD-10-CM

## 2024-12-05 DIAGNOSIS — M25.512 CHRONIC LEFT SHOULDER PAIN: ICD-10-CM

## 2024-12-05 DIAGNOSIS — M54.12 RADICULOPATHY, CERVICAL: ICD-10-CM

## 2024-12-05 DIAGNOSIS — G89.29 CHRONIC LEFT SHOULDER PAIN: ICD-10-CM

## 2024-12-05 DIAGNOSIS — M25.512 LEFT SHOULDER PAIN, UNSPECIFIED CHRONICITY: ICD-10-CM

## 2024-12-05 PROCEDURE — 97110 THERAPEUTIC EXERCISES: CPT

## 2024-12-05 PROCEDURE — 97140 MANUAL THERAPY 1/> REGIONS: CPT

## 2024-12-05 NOTE — THERAPY TREATMENT NOTE
Physical Therapy Treatment Note  [facility]  A department [address], [place] [address]    Patient: Red Carver                                                 Visit Date: 2024  :     1960    Referring practitioner:    AME Wells*  Date of Initial Visit:          Type: THERAPY  Episode: L Shoulder impingement; R Shoulder Pain; R Cx radiculopathy  Number of visits this episode: 34    Visit Diagnoses:    ICD-10-CM ICD-9-CM   1. Impingement of left shoulder  M25.812 719.81   2. Chronic right shoulder pain  M25.511 719.41    G89.29 338.29   3. Left shoulder pain, unspecified chronicity  M25.512 719.41   4. Radiculopathy, cervical  M54.12 723.4   5. Chronic left shoulder pain  M25.512 719.41    G89.29 338.29       SUBJECTIVE     Subjective: He had a [facility] and he told him just to let him know when we need more auths. He's just a little bit stiff. Denies any tingling lately.     PAIN: 3/10 with movement of the arm      OBJECTIVE     Objective      Therapeutic Exercises    84930 Units Comments   B standing SA punches against green TB   2x10     SA BOSU rocking  2x10     UE walks in modified plantigrade   1x10    UE lateral walks in modified plantigrade   2x10                    Timed Minutes 20       Manual Therapy     99583  Comments   STM to B UT/LS    IASTM w/ blue ridged roller to thoracic     Thoracic PA mobs and side glides     Cervical manual distraction with bilateral side bends         Timed Minutes 25     Therapy Education/Self Care 86760   Education offered today    Medbride Code [email or web address]   Ongoing HEP   Access Code: [email or web address]  URL: [email or web address]  Date: 2024  Prepared by: Elena Gill     Exercises  - Supine Shoulder Flexion AAROM with Hands Clasped  - 1 x daily - 7 x weekly - 3 sets - 10 reps  - Standing Shoulder Abduction AAROM with Dowel  - 1 x daily - 7 x weekly  - 3 sets - 10 reps  - Seated Scapular Retraction  - 1 x daily - 7 x weekly - 3 sets - 10 reps - 5 hold  - Corner Pec Major Stretch  - 1 x daily - 7 x weekly - 3 reps - 30-60 sec hold  - Seated Shoulder Flexion with Resistance  - 1 x daily - 7 x weekly - 2 sets - 10 reps  - Seated Shoulder Abduction with Resistance  - 1 x daily - 7 x weekly - 2 sets - 10 reps  - Seated Single Arm Shoulder External Rotation with Self-Anchored Resistance  - 1 x daily - 7 x weekly - 2 sets - 10 reps  - First Rib Mobilization with Strap  - 1 x daily - 7 x weekly - 5 reps - 10-15 sec hold  Added on 10/8:  - Ulnar Nerve Flossing  - 1 x daily - 7 x weekly - 3 sets - 10 reps  - Median Nerve Flossing  - 1 x daily - 7 x weekly - 3 sets - 10 reps  - Radial Nerve Flossing  - 1 x daily - 7 x weekly - 3 sets - 10 reps   Timed Minutes        Total Timed Treatment:     45   mins  Total Time of Visit:             45   mins         ASSESSMENT/PLAN     GOALS   Goals                                                    Progress Note due by 12/14/24                                                                Recert due by 12/18/24   STG by: 4 weeks Comments Date Status   Decrease subjective pain to 2/10 11/14: 3/10 11/14  Progressing   Decreased muscle guarding  throughout shoulder girdle 11/14: Decreased muscle guarding of the shoulder 11/14  MET   LTG by: 8 weeks         Symmetrical valerie shoulder flexion and abduction to at least 150 deg actively to improve ability to reach into overhead cabinets Flexion R 164 L 178 degrees, abduction R 165, L 160 degrees  9/20  Met   Able to resume household and work activities without exacerbation of symptoms 11/14: still struggling to carry heavy items with his right arm and still unable to wash his back 11/14 ongoing   Gross shoulder MMT at least 4+/5 to improve ability to lift items and groceries at home  See table 9/20 Met   Understands improved ergonomics for work and HEP for flexibility and stability Performs  daily  11/14 ongoing   Improve QuickDASH score to 20 or less for R  11/14: 54.55 R shoulder 11/14 progressing   Reports no pain except occasional minor twinges no more than 2/10 See STG#1 11/14 ongoing   Reports eliminated R UE radicular symptoms for 1 week 11/14: He states that the tingling and numbness doesn't seem to go away.  11/14 Ongoing   Be independent with HEP for L shoulder and R UE radicular pain  performs daily 11/14 Ongoing   Improve cervical rotation to 65 degrees bilaterally with report of no pain 11/14: L 56, R 48 with pain   After treatment L 58 R 56 11/14 Ongoing     Anticipated CPT codes: Therapeutic Exercise 66608, Manual Therapy 14622, Therapeutic Activity 22446, Neuromuscular ReEducation 18752, Self Care/Home Management 47637, Estim Attended 87868, and Estim Unattended 09458    Assessment/Plan     ASSESSMENT:   Today we challenged his B shoulder stability in WBing and isolated serratus anterior. He did very well with minimal symptoms. Concluded session with focus on decreasing soft tissue restrictions and improving cervical mobility.     PLAN:   Continue to work on strength and mobility of the shoulders and progress these as tolerated.     Signature:  Elena Gill PTA, [place] License #: [id number]  Electronically signed on 12/5/2024        [address]  [place] [address]  [phone]

## 2024-12-10 ENCOUNTER — HOSPITAL ENCOUNTER (OUTPATIENT)
Dept: PHYSICAL THERAPY | Facility: HOSPITAL | Age: 64
Setting detail: THERAPIES SERIES
Discharge: HOME | End: 2024-12-10
Payer: OTHER GOVERNMENT

## 2024-12-10 DIAGNOSIS — M25.512 LEFT SHOULDER PAIN, UNSPECIFIED CHRONICITY: ICD-10-CM

## 2024-12-10 DIAGNOSIS — G89.29 CHRONIC RIGHT SHOULDER PAIN: ICD-10-CM

## 2024-12-10 DIAGNOSIS — M54.12 RADICULOPATHY, CERVICAL: ICD-10-CM

## 2024-12-10 DIAGNOSIS — G89.29 CHRONIC LEFT SHOULDER PAIN: ICD-10-CM

## 2024-12-10 DIAGNOSIS — M25.511 CHRONIC RIGHT SHOULDER PAIN: ICD-10-CM

## 2024-12-10 DIAGNOSIS — M25.812 IMPINGEMENT OF LEFT SHOULDER: Primary | ICD-10-CM

## 2024-12-10 DIAGNOSIS — M25.512 CHRONIC LEFT SHOULDER PAIN: ICD-10-CM

## 2024-12-10 PROCEDURE — 97140 MANUAL THERAPY 1/> REGIONS: CPT

## 2024-12-10 PROCEDURE — 97530 THERAPEUTIC ACTIVITIES: CPT

## 2024-12-10 PROCEDURE — 97112 NEUROMUSCULAR REEDUCATION: CPT

## 2024-12-10 NOTE — THERAPY TREATMENT NOTE
Physical Therapy Treatment Note  [facility]  [address] [address] [place] [address]    Patient: Red Carver                                                 Visit Date: 12/10/2024  :     1960    Referring practitioner:    AME Wells*  Date of Initial Visit:          Type: THERAPY  Episode: L Shoulder impingement; R Shoulder Pain; R Cx radiculopathy  Number of visits this episode: 35    Visit Diagnoses:    ICD-10-CM ICD-9-CM   1. Impingement of left shoulder  M25.812 719.81   2. Chronic right shoulder pain  M25.511 719.41    G89.29 338.29   3. Left shoulder pain, unspecified chronicity  M25.512 719.41   4. Radiculopathy, cervical  M54.12 723.4   5. Chronic left shoulder pain  M25.512 719.41    G89.29 338.29         SUBJECTIVE     Subjective: He feels good and is getting better. Reports that he's not hurting or anything, and he's consistently getting better. He bragged to his [facility] about how much better he's been doing. Reports 75% improvement since IE. Continues to have tightness down RUE, though the tingling has gone away. The left arm improves greatly after subscap releases, though the pain returns after a little while.     PAIN: 3/10 with movement only      OBJECTIVE     Objective      Therapeutic Activities    74859 Comments   All goals assessed for PN     BUE unweighted head turns, nods B rotation before: 58 deg   After: R: 65 deg, L 70 deg          Timed Minutes 22     Neuromuscular Reeducation     50533 Comments   Incline stir the pots on yellow PB     Wall clock taps         Timed Minutes 15     Manual Therapy     89651  Comments   Cervical manual T/D    L subscap release        Timed Minutes 8       Therapy Education/Self Care [id number]   Education offered today    Halle Code [email or web address]   Ongoing HEP   Access Code: [email or web address]  URL: [email or web address]  Date: 2024  Prepared by: Elena Gill      Exercises  - Supine Shoulder Flexion AAROM with Hands Clasped  - 1 x daily - 7 x weekly - 3 sets - 10 reps  - Standing Shoulder Abduction AAROM with Dowel  - 1 x daily - 7 x weekly - 3 sets - 10 reps  - Seated Scapular Retraction  - 1 x daily - 7 x weekly - 3 sets - 10 reps - 5 hold  - Corner Pec Major Stretch  - 1 x daily - 7 x weekly - 3 reps - 30-60 sec hold  - Seated Shoulder Flexion with Resistance  - 1 x daily - 7 x weekly - 2 sets - 10 reps  - Seated Shoulder Abduction with Resistance  - 1 x daily - 7 x weekly - 2 sets - 10 reps  - Seated Single Arm Shoulder External Rotation with Self-Anchored Resistance  - 1 x daily - 7 x weekly - 2 sets - 10 reps  - First Rib Mobilization with Strap  - 1 x daily - 7 x weekly - 5 reps - 10-15 sec hold  Added on 10/8:  - Ulnar Nerve Flossing  - 1 x daily - 7 x weekly - 3 sets - 10 reps  - Median Nerve Flossing  - 1 x daily - 7 x weekly - 3 sets - 10 reps  - Radial Nerve Flossing  - 1 x daily - 7 x weekly - 3 sets - 10 reps   Timed Minutes        Total Timed Treatment:     45   mins  Total Time of Visit:             45   mins         ASSESSMENT/PLAN     GOALS   Goals                                                    Progress Note due by 12/14/24                                                                Recert due by 12/18/24   STG by: 4 weeks Comments Date Status   Decrease subjective pain to 2/10 3/10 12/10  Progressing   Decreased muscle guarding  throughout shoulder girdle  Decreased muscle guarding of the shoulder 11/14  MET   LTG by: 8 weeks         Symmetrical valerie shoulder flexion and abduction to at least 150 deg actively to improve ability to reach into overhead cabinets Flexion R 164 L 178 degrees, abduction R 165, L 160 degrees  9/20  Met   Able to resume household and work activities without exacerbation of symptoms Housework is about the same, though he can now reach behind his back much better. Gets a little twinge when lifting something with poor  biomechanics    12/10 Progressing   Gross shoulder MMT at least 4+/5 to improve ability to lift items and groceries at home  See table 9/20 Met   Understands improved ergonomics for work and HEP for flexibility and stability Performs daily  12/10 ongoing   Improve QuickDASH score to 20 or less for R  11/14: 54.55 R shoulder  12/10: 34.09 12/10 progressing   Reports no pain except occasional minor twinges no more than 2/10 See STG#1 12/10 ongoing   Reports eliminated R UE radicular symptoms for 1 week Last occurrence 11/21 12/10 MET   Be independent with HEP for L shoulder and R UE radicular pain  performs daily 12/10 Ongoing   Improve cervical rotation to 65 degrees bilaterally with report of no pain 11/14: L 56, R 48 with pain   After treatment L 58 R 56    12/10: 58 deg B before treatment, After: R: 65 deg, L 70 deg  12/10 MET      Anticipated CPT codes: Therapeutic Exercise 25577, Manual Therapy 86488, Therapeutic Activity 72989, Neuromuscular ReEducation 66469, Self Care/Home Management 32979, Estim Attended [id number], and Estim Unattended [id number]    Assessment & Plan       Assessment  Impairments: abnormal muscle tone, abnormal or restricted ROM, activity intolerance, lacks appropriate home exercise program and pain with function   Functional limitations: lifting, uncomfortable because of pain, reaching behind back, reaching overhead and unable to perform repetitive tasks   Prognosis: good    Plan  Therapy options: will be seen for skilled therapy services  Planned modality interventions: dry needling, low level laser therapy and TENS  Planned therapy interventions: soft tissue mobilization, manual therapy, stretching, strengthening, joint mobilization, functional ROM exercises, therapeutic activities, neuromuscular re-education, body mechanics training, flexibility and home exercise program  Frequency: 2x week  Duration in weeks: 6  Treatment plan discussed with: patient       ASSESSMENT: Goals assessed for Progress  Note Today. Two additional goals met today, for a total of 1/2 STG and 4/9 LT goals met at this time. He is progressing well overall and is no longer experiencing the RUE n/t like he was before. He is faithful to his HEP, and he demonstrates a 20 point improvement in QuickDash score since last measurement. He is better able to reach behind his back for self care tasks now, though continues to have some difficulty with lifting things when he reaches to the side to grab them. The Pt would benefit from skilled PTx to decrease pain, improve functional mobility, progress toward goals, and increase overall quality of life.    PLAN:   Continue to work on strength and mobility of the shoulders and progress these as tolerated.     Signature:  Krystyna Mccormack PT DPT, [place] License #: [id number]    Electronically signed on 12/10/2024        Markus Smith. [id number]  [phone]

## 2024-12-12 ENCOUNTER — HOSPITAL ENCOUNTER (OUTPATIENT)
Dept: PHYSICAL THERAPY | Facility: HOSPITAL | Age: 64
Setting detail: THERAPIES SERIES
Discharge: HOME | End: 2024-12-12
Payer: OTHER GOVERNMENT

## 2024-12-12 DIAGNOSIS — M25.511 CHRONIC RIGHT SHOULDER PAIN: ICD-10-CM

## 2024-12-12 DIAGNOSIS — M54.12 RADICULOPATHY, CERVICAL: ICD-10-CM

## 2024-12-12 DIAGNOSIS — M25.512 LEFT SHOULDER PAIN, UNSPECIFIED CHRONICITY: ICD-10-CM

## 2024-12-12 DIAGNOSIS — G89.29 CHRONIC RIGHT SHOULDER PAIN: ICD-10-CM

## 2024-12-12 DIAGNOSIS — M25.512 CHRONIC LEFT SHOULDER PAIN: ICD-10-CM

## 2024-12-12 DIAGNOSIS — G89.29 CHRONIC LEFT SHOULDER PAIN: ICD-10-CM

## 2024-12-12 DIAGNOSIS — M25.812 IMPINGEMENT OF LEFT SHOULDER: Primary | ICD-10-CM

## 2024-12-12 PROCEDURE — 97110 THERAPEUTIC EXERCISES: CPT

## 2024-12-12 PROCEDURE — 97140 MANUAL THERAPY 1/> REGIONS: CPT

## 2024-12-12 NOTE — THERAPY TREATMENT NOTE
Physical Therapy Treatment Note  [facility]  A department [address], [place] [address]    Patient: Red Carver                                                 Visit Date: 2024  :     1960    Referring practitioner:    AME Wells*  Date of Initial Visit:          Type: THERAPY  Episode: L Shoulder impingement; R Shoulder Pain; R Cx radiculopathy  Number of visits this episode: 36    Visit Diagnoses:    ICD-10-CM ICD-9-CM   1. Impingement of left shoulder  M25.812 719.81   2. Chronic right shoulder pain  M25.511 719.41    G89.29 338.29   3. Left shoulder pain, unspecified chronicity  M25.512 719.41   4. Radiculopathy, cervical  M54.12 723.4   5. Chronic left shoulder pain  M25.512 719.41    G89.29 338.29       SUBJECTIVE     Subjective: States he is feeling good today, noting he still has the tightness in his R arm into his chest. Notes under his L shoulder blade gets tight after a few weeks.    PAIN: 2-3/10 with movement only      OBJECTIVE     Objective      Therapeutic Exercises    05267 Units Comments   Doorway pec stretches, 3 way     Wall walk w/ GTB x2 Up/down zig zag pattern   Standing QL stretch @ door frame     Shoulder flex pulses w/ GTB x5         Timed Minutes 18       Manual Therapy     63249  Comments   STM to B UT/LS    IASTM w/ Powerboost L1 ball to R UT/LS/deltoid    Active L subscap release w/ Powerboost L1 point    STM to L bicep tendon        Timed Minutes 23       Therapy Education/Self Care 39871   Education offered today    Halle Code [email or web address]   Ongoing HEP   Access Code: [email or web address]  URL: [email or web address]  Date: 2024  Prepared by: Elena Gill     Exercises  - Supine Shoulder Flexion AAROM with Hands Clasped  - 1 x daily - 7 x weekly - 3 sets - 10 reps  - Standing Shoulder Abduction AAROM with Dowel  - 1 x daily - 7 x weekly - 3 sets - 10 reps  - Seated Scapular Retraction   - 1 x daily - 7 x weekly - 3 sets - 10 reps - 5 hold  - Corner Pec Major Stretch  - 1 x daily - 7 x weekly - 3 reps - 30-60 sec hold  - Seated Shoulder Flexion with Resistance  - 1 x daily - 7 x weekly - 2 sets - 10 reps  - Seated Shoulder Abduction with Resistance  - 1 x daily - 7 x weekly - 2 sets - 10 reps  - Seated Single Arm Shoulder External Rotation with Self-Anchored Resistance  - 1 x daily - 7 x weekly - 2 sets - 10 reps  - First Rib Mobilization with Strap  - 1 x daily - 7 x weekly - 5 reps - 10-15 sec hold  Added on 10/8:  - Ulnar Nerve Flossing  - 1 x daily - 7 x weekly - 3 sets - 10 reps  - Median Nerve Flossing  - 1 x daily - 7 x weekly - 3 sets - 10 reps  - Radial Nerve Flossing  - 1 x daily - 7 x weekly - 3 sets - 10 reps   Timed Minutes        Total Timed Treatment:     41   mins  Total Time of Visit:             41   mins         ASSESSMENT/PLAN     GOALS   Goals                                                    Progress Note due by 1/9/25                                                                Recert due by 12/18/24   STG by: 4 weeks Comments Date Status   Decrease subjective pain to 2/10 3/10 12/10  Progressing   Decreased muscle guarding  throughout shoulder girdle  Decreased muscle guarding of the shoulder 11/14  MET   LTG by: 8 weeks         Symmetrical valerie shoulder flexion and abduction to at least 150 deg actively to improve ability to reach into overhead cabinets Flexion R 164 L 178 degrees, abduction R 165, L 160 degrees  9/20  Met   Able to resume household and work activities without exacerbation of symptoms Housework is about the same, though he can now reach behind his back much better. Gets a little twinge when lifting something with poor biomechanics    12/10 Progressing   Gross shoulder MMT at least 4+/5 to improve ability to lift items and groceries at home  See table 9/20 Met   Understands improved ergonomics for work and HEP for flexibility and stability Performs daily   12/10 ongoing   Improve QuickDASH score to 20 or less for R  11/14: 54.55 R shoulder  12/10: 34.09 12/10 progressing   Reports no pain except occasional minor twinges no more than 2/10 See STG#1 12/10 ongoing   Reports eliminated R UE radicular symptoms for 1 week Last occurrence 11/21 12/10 MET   Be independent with HEP for L shoulder and R UE radicular pain  performs daily 12/10 Ongoing   Improve cervical rotation to 65 degrees bilaterally with report of no pain 11/14: L 56, R 48 with pain   After treatment L 58 R 56    12/10: 58 deg B before treatment, After: R: 65 deg, L 70 deg  12/10 MET      Anticipated CPT codes: Therapeutic Exercise 00848, Manual Therapy 30897, Therapeutic Activity 17671, Neuromuscular ReEducation 02832, Self Care/Home Management 32819, Estim Attended 52081, and Estim Unattended 67772    Assessment/Plan     ASSESSMENT: Pt reported cont slight pain with overhead movements in his R shoulder, noting he is still having tightness throughout his RUE that gets better after exercise. He noted slight discomfort in his L bicep tendon area following shoulder flex pulses, but after short rest break it decreased. He presented with cont B UT/LS tightness R>L, along with TP activity. He reported decreased tightness with improved B shoulder mobility following treatment.    PLAN:   Continue to work on strength and mobility of the shoulders and progress these as tolerated.     Signature:  Tyrone Acuña PTA, [place] License #: [id number]    Electronically signed on 12/12/2024        [address] [place] [address]. [address]  [phone]

## 2024-12-17 ENCOUNTER — HOSPITAL ENCOUNTER (OUTPATIENT)
Dept: PHYSICAL THERAPY | Facility: HOSPITAL | Age: 64
Setting detail: THERAPIES SERIES
Discharge: HOME | End: 2024-12-17
Payer: OTHER GOVERNMENT

## 2024-12-17 DIAGNOSIS — M25.512 CHRONIC LEFT SHOULDER PAIN: ICD-10-CM

## 2024-12-17 DIAGNOSIS — G89.29 CHRONIC RIGHT SHOULDER PAIN: ICD-10-CM

## 2024-12-17 DIAGNOSIS — M25.812 IMPINGEMENT OF LEFT SHOULDER: Primary | ICD-10-CM

## 2024-12-17 DIAGNOSIS — M25.511 CHRONIC RIGHT SHOULDER PAIN: ICD-10-CM

## 2024-12-17 DIAGNOSIS — M25.512 LEFT SHOULDER PAIN, UNSPECIFIED CHRONICITY: ICD-10-CM

## 2024-12-17 DIAGNOSIS — M54.12 RADICULOPATHY, CERVICAL: ICD-10-CM

## 2024-12-17 DIAGNOSIS — G89.29 CHRONIC LEFT SHOULDER PAIN: ICD-10-CM

## 2024-12-17 PROCEDURE — 97140 MANUAL THERAPY 1/> REGIONS: CPT

## 2024-12-17 PROCEDURE — 97110 THERAPEUTIC EXERCISES: CPT

## 2024-12-17 NOTE — THERAPY TREATMENT NOTE
30 Day Progress Note and 90 Day Recertification Addendum      Patient: Red Carver           : 1960  Visit Date: 2024  Referring practitioner: AME Wells*  Date of Initial Visit: Type: THERAPY  Episode: L Shoulder impingement; R Shoulder Pain; R Cx radiculopathy    Visit Diagnoses:    ICD-10-CM ICD-9-CM   1. Impingement of left shoulder  M25.812 719.81   2. Chronic right shoulder pain  M25.511 719.41    G89.29 338.29   3. Left shoulder pain, unspecified chronicity  M25.512 719.41   4. Radiculopathy, cervical  M54.12 723.4   5. Chronic left shoulder pain  M25.512 719.41    G89.29 338.29          Clinical Progress: improved  Home Program Compliance: Yes  Progress toward previous goals: Partially Met  Prognosis to achieve goals: good    Objective     Assessment & Plan       Assessment  Impairments: abnormal muscle tone, abnormal or restricted ROM, activity intolerance, lacks appropriate home exercise program and pain with function   Functional limitations: lifting, uncomfortable because of pain, reaching behind back, reaching overhead and unable to perform repetitive tasks   Prognosis: good    Plan  Therapy options: will be seen for skilled therapy services  Planned modality interventions: dry needling, low level laser therapy and TENS  Planned therapy interventions: soft tissue mobilization, manual therapy, stretching, strengthening, joint mobilization, functional ROM exercises, therapeutic activities, neuromuscular re-education, body mechanics training, flexibility and home exercise program  Frequency: 2x week  Duration in weeks: 6  Treatment plan discussed with: patient        Anticipated CPT codes: Therapeutic Exercise 58740, Manual Therapy 49532, Therapeutic Activity 90330, Neuromuscular ReEducation 39187, and Self Care/Home Management 76019    I reviewed the treatment and goals with Humble Acuña PTA and agree with the POC.    SIGNATURE: Gregorio Gotti PT, License #:  [id number]  Electronically Signed on 12/17/2024      90 Day Recertification  Certification Period: 12/17/2024 through 3/17/2025  Based upon review of the patient's progress and continued therapy plan, it is my medical opinion that Red Carver should continue physical therapy treatment at [facility]     PHYSICIAN: Manuelito Perez PA-C (NPI: [id number])    Signature: __________________________________________________DATE: ___________________     Please sign and return via fax to [phone].   Thank you so much for letting us work with Red. I appreciate your letting us work with your patients. If you have any questions or concerns, please don't hesitate to contact me.

## 2024-12-19 ENCOUNTER — HOSPITAL ENCOUNTER (OUTPATIENT)
Dept: PHYSICAL THERAPY | Facility: HOSPITAL | Age: 64
Setting detail: THERAPIES SERIES
Discharge: HOME | End: 2024-12-19
Payer: OTHER GOVERNMENT

## 2024-12-19 DIAGNOSIS — M54.12 RADICULOPATHY, CERVICAL: ICD-10-CM

## 2024-12-19 DIAGNOSIS — M25.512 LEFT SHOULDER PAIN, UNSPECIFIED CHRONICITY: ICD-10-CM

## 2024-12-19 DIAGNOSIS — G89.29 CHRONIC RIGHT SHOULDER PAIN: ICD-10-CM

## 2024-12-19 DIAGNOSIS — M25.512 CHRONIC LEFT SHOULDER PAIN: ICD-10-CM

## 2024-12-19 DIAGNOSIS — M25.511 CHRONIC RIGHT SHOULDER PAIN: ICD-10-CM

## 2024-12-19 DIAGNOSIS — G89.29 CHRONIC LEFT SHOULDER PAIN: ICD-10-CM

## 2024-12-19 DIAGNOSIS — M25.812 IMPINGEMENT OF LEFT SHOULDER: Primary | ICD-10-CM

## 2024-12-19 PROCEDURE — 97140 MANUAL THERAPY 1/> REGIONS: CPT

## 2024-12-19 PROCEDURE — 97110 THERAPEUTIC EXERCISES: CPT

## 2024-12-19 NOTE — THERAPY TREATMENT NOTE
Physical Therapy Treatment Note  [facility]  A department [address], [place] [address]    Patient: Red Carver                                                 Visit Date: 2024  :     1960    Referring practitioner:    AME Wells*  Date of Initial Visit:          Type: THERAPY  Episode: L Shoulder impingement; R Shoulder Pain; R Cx radiculopathy  Number of visits this episode: 38    Visit Diagnoses:    ICD-10-CM ICD-9-CM   1. Impingement of left shoulder  M25.812 719.81   2. Chronic right shoulder pain  M25.511 719.41    G89.29 338.29   3. Left shoulder pain, unspecified chronicity  M25.512 719.41   4. Radiculopathy, cervical  M54.12 723.4   5. Chronic left shoulder pain  M25.512 719.41    G89.29 338.29       SUBJECTIVE     Subjective: States he is feeling good today, noting he still feels some tightness in his R shoulder and arm but his L is doing better.     PAIN: 3/10 with movement only      OBJECTIVE     Objective      Therapeutic Exercises    83619 Units Comments   Seated pec stretch w/ bolster     Sh flex w/ 5# DB's 2x10    Sh ABD w/ elbows at 90 w/ 5# DB's 2x10    Single arm chest press @ cybex L2 2x10 ea    Straight arm pull down @ cybex L2 2x10    Single arm bent over rows w/ 20# DB 2x10 ea    Timed Minutes 30       Manual Therapy     08840  Comments   STM to R post RC    STM to B UT/LS    BUE LAD    B GH inf mobs    R sh IR stretch        Timed Minutes 14       Therapy Education/Self Care [id number]   Education offered today    [employer] Code [email or web address]   Ongoing HEP   Access Code: [email or web address]  URL: [email or web address]  Date: 2024  Prepared by: Elena Gill     Exercises  - Supine Shoulder Flexion AAROM with Hands Clasped  - 1 x daily - 7 x weekly - 3 sets - 10 reps  - Standing Shoulder Abduction AAROM with Dowel  - 1 x daily - 7 x weekly - 3 sets - 10 reps  - Seated Scapular Retraction  - 1 x daily -  7 x weekly - 3 sets - 10 reps - 5 hold  - Corner Pec Major Stretch  - 1 x daily - 7 x weekly - 3 reps - 30-60 sec hold  - Seated Shoulder Flexion with Resistance  - 1 x daily - 7 x weekly - 2 sets - 10 reps  - Seated Shoulder Abduction with Resistance  - 1 x daily - 7 x weekly - 2 sets - 10 reps  - Seated Single Arm Shoulder External Rotation with Self-Anchored Resistance  - 1 x daily - 7 x weekly - 2 sets - 10 reps  - First Rib Mobilization with Strap  - 1 x daily - 7 x weekly - 5 reps - 10-15 sec hold  Added on 10/8:  - Ulnar Nerve Flossing  - 1 x daily - 7 x weekly - 3 sets - 10 reps  - Median Nerve Flossing  - 1 x daily - 7 x weekly - 3 sets - 10 reps  - Radial Nerve Flossing  - 1 x daily - 7 x weekly - 3 sets - 10 reps   Timed Minutes        Total Timed Treatment:     43   mins  Total Time of Visit:             43  mins         ASSESSMENT/PLAN     GOALS   Goals                                                    Progress Note due by 1/9/25                                                                Recert due by 3/17/25   STG by: 4 weeks Comments Date Status   Decrease subjective pain to 2/10 3/10 12/10  Progressing   Decreased muscle guarding  throughout shoulder girdle  Decreased muscle guarding of the shoulder 11/14  MET   LTG by: 8 weeks         Symmetrical valerie shoulder flexion and abduction to at least 150 deg actively to improve ability to reach into overhead cabinets Flexion R 164 L 178 degrees, abduction R 165, L 160 degrees  9/20  Met   Able to resume household and work activities without exacerbation of symptoms Housework is about the same, though he can now reach behind his back much better. Gets a little twinge when lifting something with poor biomechanics    12/10 Progressing   Gross shoulder MMT at least 4+/5 to improve ability to lift items and groceries at home  See table 9/20 Met   Understands improved ergonomics for work and HEP for flexibility and stability Performs daily  12/10 ongoing    Improve QuickDASH score to 20 or less for R  11/14: 54.55 R shoulder  12/10: 34.09 12/10 progressing   Reports no pain except occasional minor twinges no more than 2/10 See STG#1 12/10 ongoing   Reports eliminated R UE radicular symptoms for 1 week Last occurrence 11/21 12/10 MET   Be independent with HEP for L shoulder and R UE radicular pain  performs daily 12/10 Ongoing   Improve cervical rotation to 65 degrees bilaterally with report of no pain 11/14: L 56, R 48 with pain   After treatment L 58 R 56    12/10: 58 deg B before treatment, After: R: 65 deg, L 70 deg  12/10 MET      Anticipated CPT codes: Therapeutic Exercise 76500, Manual Therapy 88028, Therapeutic Activity 95349, Neuromuscular ReEducation 38658, Self Care/Home Management 83332, Estim Attended 73695, and Estim Unattended [id number]    Assessment/Plan     ASSESSMENT: Pt reported some cont tightness in his shoulder from doing his exercises, noting he has been doing more at home. He noted no increased discomfort during exercises, but did take take few short rest breaks d/t some BUE fatigue. He presented with some cont B UT/LS tightness R>L, with tightness and TP activity in his R post RC region. He reported decreased tightness with improved B shoulder mobility following treatment.    PLAN:   Continue to work on strength and mobility of the shoulders and progress these as tolerated.     Signature:  Tyrone Acuña PTA, [place] License #: [id number]    Electronically signed on 12/19/2024        [address] [place] [address] [place]. [address]  [phone]

## 2024-12-23 ENCOUNTER — HOSPITAL ENCOUNTER (OUTPATIENT)
Dept: PHYSICAL THERAPY | Facility: HOSPITAL | Age: 64
Setting detail: THERAPIES SERIES
Discharge: HOME | End: 2024-12-23
Payer: OTHER GOVERNMENT

## 2024-12-23 DIAGNOSIS — G89.29 CHRONIC RIGHT SHOULDER PAIN: ICD-10-CM

## 2024-12-23 DIAGNOSIS — M54.12 RADICULOPATHY, CERVICAL: ICD-10-CM

## 2024-12-23 DIAGNOSIS — M25.812 IMPINGEMENT OF LEFT SHOULDER: Primary | ICD-10-CM

## 2024-12-23 DIAGNOSIS — M25.511 CHRONIC RIGHT SHOULDER PAIN: ICD-10-CM

## 2024-12-23 DIAGNOSIS — M25.512 LEFT SHOULDER PAIN, UNSPECIFIED CHRONICITY: ICD-10-CM

## 2024-12-23 PROCEDURE — 97110 THERAPEUTIC EXERCISES: CPT

## 2024-12-23 PROCEDURE — 97140 MANUAL THERAPY 1/> REGIONS: CPT

## 2024-12-23 NOTE — THERAPY TREATMENT NOTE
Physical Therapy Treatment Note  [facility]  A [address], [place] [address]    Patient: Red Carver                                                 Visit Date: 2024  :     1960    Referring practitioner:    AME Wells*  Date of Initial Visit:          Type: THERAPY  Episode: L Shoulder impingement; R Shoulder Pain; R Cx radiculopathy  Number of visits this episode: 39    Visit Diagnoses:    ICD-10-CM ICD-9-CM   1. Impingement of left shoulder  M25.812 719.81   2. Chronic right shoulder pain  M25.511 719.41    G89.29 338.29   3. Left shoulder pain, unspecified chronicity  M25.512 719.41   4. Radiculopathy, cervical  M54.12 723.4       SUBJECTIVE     Subjective: He's doing good. Shoulder and neck are getting better. He's a little sore from increasing weight, but he can tell he's getting stronger.     PAIN: 3/10 with movement only      OBJECTIVE     Objective     Therapeutic Exercises    65763 Units Comments   SciFit BUE warmup  6 min  3 fwd, 3 bwd, switching every minute, lvl 4.5   Chin tuck + B SB  2x10     Seated ER with elbow on table  2x10 8#   Behind the back IR towel stretch  3x30' ea                        Timed Minutes 33     Manual Therapy     57022  Comments   Cervical manual T/D    Side glides with lateral bends      throughout cervical spine             Timed Minutes 10       Therapy Education/Self Care 54048   Education offered today    Medbride Code [email or web address]   Ongoing HEP   Access Code: [email or web address]  URL: [email or web address]  Date: 2024  Prepared by: Elena Gill     Exercises  - Supine Shoulder Flexion AAROM with Hands Clasped  - 1 x daily - 7 x weekly - 3 sets - 10 reps  - Standing Shoulder Abduction AAROM with Dowel  - 1 x daily - 7 x weekly - 3 sets - 10 reps  - Seated Scapular Retraction  - 1 x daily - 7 x weekly - 3 sets - 10 reps - 5 hold  - Corner Pec Major Stretch  -  1 x daily - 7 x weekly - 3 reps - 30-60 sec hold  - Seated Shoulder Flexion with Resistance  - 1 x daily - 7 x weekly - 2 sets - 10 reps  - Seated Shoulder Abduction with Resistance  - 1 x daily - 7 x weekly - 2 sets - 10 reps  - Seated Single Arm Shoulder External Rotation with Self-Anchored Resistance  - 1 x daily - 7 x weekly - 2 sets - 10 reps  - First Rib Mobilization with Strap  - 1 x daily - 7 x weekly - 5 reps - 10-15 sec hold  Added on 10/8:  - Ulnar Nerve Flossing  - 1 x daily - 7 x weekly - 3 sets - 10 reps  - Median Nerve Flossing  - 1 x daily - 7 x weekly - 3 sets - 10 reps  - Radial Nerve Flossing  - 1 x daily - 7 x weekly - 3 sets - 10 reps   Timed Minutes        Total Timed Treatment:     43   mins  Total Time of Visit:             43  mins         ASSESSMENT/PLAN     GOALS   Goals                                                    Progress Note due by 1/9/25                                                                Recert due by 3/17/25   STG by: 4 weeks Comments Date Status   Decrease subjective pain to 2/10 3/10 12/10  Progressing   Decreased muscle guarding  throughout shoulder girdle  Decreased muscle guarding of the shoulder 11/14  MET   LTG by: 8 weeks         Symmetrical valerie shoulder flexion and abduction to at least 150 deg actively to improve ability to reach into overhead cabinets Flexion R 164 L 178 degrees, abduction R 165, L 160 degrees  9/20  Met   Able to resume household and work activities without exacerbation of symptoms Housework is about the same, though he can now reach behind his back much better. Gets a little twinge when lifting something with poor biomechanics    12/10 Progressing   Gross shoulder MMT at least 4+/5 to improve ability to lift items and groceries at home  See table 9/20 Met   Understands improved ergonomics for work and HEP for flexibility and stability Performs daily  12/10 ongoing   Improve QuickDASH score to 20 or less for R  11/14: 54.55 R  shoulder  12/10: 34.09 12/10 progressing   Reports no pain except occasional minor twinges no more than 2/10 See STG#1 12/10 ongoing   Reports eliminated R UE radicular symptoms for 1 week Last occurrence 11/21 12/10 MET   Be independent with HEP for L shoulder and R UE radicular pain  performs daily 12/10 Ongoing   Improve cervical rotation to 65 degrees bilaterally with report of no pain 11/14: L 56, R 48 with pain   After treatment L 58 R 56    12/10: 58 deg B before treatment, After: R: 65 deg, L 70 deg  12/10 MET      Anticipated CPT codes: Therapeutic Exercise 41100, Manual Therapy 66439, Therapeutic Activity 88829, Neuromuscular ReEducation 03467, Self Care/Home Management 98922, Estim Attended 58280, and Estim Unattended 15257    Assessment/Plan     ASSESSMENT: Continued to progress with B shoulder and cervical stability, as well as B GHJ IR mobility. B IR remains limited, reaching about T12/L1 bilaterally with towel stretches.     PLAN:   Continue to work on strength and mobility of the shoulders and neck and progress these as tolerated.     Signature:  Krystyna Mccormack PT DPT, [place] License #: [id number]      Electronically signed on 12/23/2024        [address] [place] [address] [place] [address]  [phone]

## 2024-12-27 ENCOUNTER — HOSPITAL ENCOUNTER (OUTPATIENT)
Dept: PHYSICAL THERAPY | Facility: HOSPITAL | Age: 64
Setting detail: THERAPIES SERIES
Discharge: HOME | End: 2024-12-27
Payer: OTHER GOVERNMENT

## 2024-12-27 DIAGNOSIS — M54.12 RADICULOPATHY, CERVICAL: ICD-10-CM

## 2024-12-27 DIAGNOSIS — M25.512 LEFT SHOULDER PAIN, UNSPECIFIED CHRONICITY: ICD-10-CM

## 2024-12-27 DIAGNOSIS — M25.511 CHRONIC RIGHT SHOULDER PAIN: ICD-10-CM

## 2024-12-27 DIAGNOSIS — G89.29 CHRONIC RIGHT SHOULDER PAIN: ICD-10-CM

## 2024-12-27 DIAGNOSIS — M25.812 IMPINGEMENT OF LEFT SHOULDER: Primary | ICD-10-CM

## 2024-12-27 PROCEDURE — 97112 NEUROMUSCULAR REEDUCATION: CPT

## 2024-12-27 PROCEDURE — 97110 THERAPEUTIC EXERCISES: CPT

## 2024-12-27 NOTE — THERAPY TREATMENT NOTE
Physical Therapy Treatment Note  [facility]  A department [place] [address], [place] [address]    Patient: Red Carver                                                 Visit Date: 2024  :     1960    Referring practitioner:    AME Wells*  Date of Initial Visit:          Type: THERAPY  Episode: L Shoulder impingement; R Shoulder Pain; R Cx radiculopathy  Number of visits this episode: 40    Visit Diagnoses:    ICD-10-CM ICD-9-CM   1. Impingement of left shoulder  M25.812 719.81   2. Chronic right shoulder pain  M25.511 719.41    G89.29 338.29   3. Left shoulder pain, unspecified chronicity  M25.512 719.41   4. Radiculopathy, cervical  M54.12 723.4       SUBJECTIVE     Subjective: He's been pretty good. He's been increasing his weight in his workouts, and sometimes has to ice his shoulders down afterwards, though this makes him feel like an athlete again. Continues to have a little bit of a twinge with movement.     PAIN: 3/10 with movement only      OBJECTIVE     Objective     Therapeutic Exercises    21362 Units Comments   SciFit BUE warmup  8 min  4 fwd, 4 bwd, switching every minute, lvl 4.5   TRX face pulls  2x15 Cues for form    Half kneeling single arm lat pulldown  2x15 Cybex lvl 5, cues for form              Timed Minutes 35     Neuromuscular Reeducation     75596 Comments   Body Blade flex/ext, abd/add, ER/IR  2x30' ea, B; greater difficulty on L with ER/IR, great difficulty B with flex/ext   Codmans circles 4# 30sec CW, 30 sec CCW                Timed Minutes 10       Therapy Education/Self Care 66380   Education offered today    Halle Code [email or web address]   Ongoing HEP   Access Code: [email or web address]  URL: [email or web address]  Date: 2024  Prepared by: Elena Gill     Exercises  - Supine Shoulder Flexion AAROM with Hands Clasped  - 1 x daily - 7 x weekly - 3 sets - 10 reps  - Standing Shoulder Abduction AAROM  with Dowel  - 1 x daily - 7 x weekly - 3 sets - 10 reps  - Seated Scapular Retraction  - 1 x daily - 7 x weekly - 3 sets - 10 reps - 5 hold  - Corner Pec Major Stretch  - 1 x daily - 7 x weekly - 3 reps - 30-60 sec hold  - Seated Shoulder Flexion with Resistance  - 1 x daily - 7 x weekly - 2 sets - 10 reps  - Seated Shoulder Abduction with Resistance  - 1 x daily - 7 x weekly - 2 sets - 10 reps  - Seated Single Arm Shoulder External Rotation with Self-Anchored Resistance  - 1 x daily - 7 x weekly - 2 sets - 10 reps  - First Rib Mobilization with Strap  - 1 x daily - 7 x weekly - 5 reps - 10-15 sec hold  Added on 10/8:  - Ulnar Nerve Flossing  - 1 x daily - 7 x weekly - 3 sets - 10 reps  - Median Nerve Flossing  - 1 x daily - 7 x weekly - 3 sets - 10 reps  - Radial Nerve Flossing  - 1 x daily - 7 x weekly - 3 sets - 10 reps   Timed Minutes        Total Timed Treatment:     45   mins  Total Time of Visit:             45  mins         ASSESSMENT/PLAN     GOALS   Goals                                                    Progress Note due by 1/9/25                                                                Recert due by 3/17/25   STG by: 4 weeks Comments Date Status   Decrease subjective pain to 2/10 3/10 12/10  Progressing   Decreased muscle guarding  throughout shoulder girdle  Decreased muscle guarding of the shoulder 11/14  MET   LTG by: 8 weeks         Symmetrical valerie shoulder flexion and abduction to at least 150 deg actively to improve ability to reach into overhead cabinets Flexion R 164 L 178 degrees, abduction R 165, L 160 degrees  9/20  Met   Able to resume household and work activities without exacerbation of symptoms Housework is about the same, though he can now reach behind his back much better. Gets a little twinge when lifting something with poor biomechanics    12/10 Progressing   Gross shoulder MMT at least 4+/5 to improve ability to lift items and groceries at home  See table 9/20 Met   Understands  improved ergonomics for work and HEP for flexibility and stability Performs daily  12/10 ongoing   Improve QuickDASH score to 20 or less for R  11/14: 54.55 R shoulder  12/10: 34.09 12/10 progressing   Reports no pain except occasional minor twinges no more than 2/10 See STG#1 12/10 ongoing   Reports eliminated R UE radicular symptoms for 1 week Last occurrence 11/21 12/10 MET   Be independent with HEP for L shoulder and R UE radicular pain  performs daily 12/10 Ongoing   Improve cervical rotation to 65 degrees bilaterally with report of no pain 11/14: L 56, R 48 with pain   After treatment L 58 R 56    12/10: 58 deg B before treatment, After: R: 65 deg, L 70 deg  12/10 MET      Anticipated CPT codes: Therapeutic Exercise 13753, Manual Therapy 32771, Therapeutic Activity 31131, Neuromuscular ReEducation 33825, Self Care/Home Management 27185, Estim Attended 36517, and Estim Unattended 60132    Assessment/Plan     ASSESSMENT: Increased intensity of activity today to further promote shoulder strength and stability. Concluded session working toward increased neuromuscular control of B shoulders, with which he struggled.       PLAN:   Continue to work on strength and mobility of the shoulders and neck and progress these as tolerated.     Signature:  Krystyna Mccormack PT DPT, [place] License #: [id number]      Electronically signed on 12/27/2024        [address] [place] [address] [place] [address]  [phone]

## 2025-01-07 ENCOUNTER — HOSPITAL ENCOUNTER (OUTPATIENT)
Dept: PHYSICAL THERAPY | Facility: HOSPITAL | Age: 65
Setting detail: THERAPIES SERIES
Discharge: HOME | End: 2025-01-07
Payer: OTHER GOVERNMENT

## 2025-01-07 DIAGNOSIS — M54.12 RADICULOPATHY, CERVICAL: ICD-10-CM

## 2025-01-07 DIAGNOSIS — G89.29 CHRONIC LEFT SHOULDER PAIN: ICD-10-CM

## 2025-01-07 DIAGNOSIS — M25.512 CHRONIC LEFT SHOULDER PAIN: ICD-10-CM

## 2025-01-07 DIAGNOSIS — M25.511 CHRONIC RIGHT SHOULDER PAIN: ICD-10-CM

## 2025-01-07 DIAGNOSIS — G89.29 CHRONIC RIGHT SHOULDER PAIN: ICD-10-CM

## 2025-01-07 DIAGNOSIS — M25.812 IMPINGEMENT OF LEFT SHOULDER: Primary | ICD-10-CM

## 2025-01-07 PROCEDURE — 97530 THERAPEUTIC ACTIVITIES: CPT

## 2025-01-07 PROCEDURE — 97110 THERAPEUTIC EXERCISES: CPT

## 2025-01-07 PROCEDURE — 97112 NEUROMUSCULAR REEDUCATION: CPT

## 2025-01-07 NOTE — THERAPY TREATMENT NOTE
Physical Therapy Treatment Note and 30 Day Progress Note  [facility]patient Therapy [facility]  A department [place] [address], [place] [address]    Patient: Red Carver                                                 Visit Date: 2025  :     1960    Referring practitioner:    AME Wells*  Date of Initial Visit:          Type: THERAPY  Episode: L Shoulder impingement; R Shoulder Pain; R Cx radiculopathy  Number of visits this episode: 41    Visit Diagnoses:    ICD-10-CM ICD-9-CM   1. Impingement of left shoulder  M25.812 719.81   2. Chronic right shoulder pain  M25.511 719.41    G89.29 338.29   3. Radiculopathy, cervical  M54.12 723.4   4. Chronic left shoulder pain  M25.512 719.41    G89.29 338.29       SUBJECTIVE     Subjective: He's been resting. He's trying to get more visits authorized so he can go through February, and at that point is amenable to dropping to 1x/wk. He can reach his back now for bathing and dressing tasks, and the towel stretches have been very helpful for this. Reports 75% improvement since IE, reporting that the R shoulder is still tight.     PAIN: 3/10 with movement only      OBJECTIVE     Objective     Therapeutic Activities    02488 Comments   All goals assessed for PN        Timed Minutes 12       Therapeutic Exercises    50924 Units Comments   SciFit BUE warmup  8 min  4 fwd, 4 bwd, switching every minute, lvl 4.5   Pushup plus, incline  2x15    Wall clocks GTB  X5 ea     Wall angels on pink bolster + GTB  X4 min     Standing open books at wall + GTB  2x10 ea               Timed Minutes 28     Neuromuscular Reeducation     11068 Comments   BOSU dome down BUE WS'ing  X3 min    BOSU dome down circles CW/CCW  X2 min ea    Arm hula hoop in flexion, abd CW, CCW     Heavy ball on wall circles CW/CCW            Timed Minutes 15       Therapy Education/Self Care 96059   Education offered today    Medbride Code [email or web address]   Ongoing HEP    Access Code: [email or web address]  URL: [email or web address]  Date: 09/26/2024  Prepared by: Elena Gill     Exercises  - Supine Shoulder Flexion AAROM with Hands Clasped  - 1 x daily - 7 x weekly - 3 sets - 10 reps  - Standing Shoulder Abduction AAROM with Dowel  - 1 x daily - 7 x weekly - 3 sets - 10 reps  - Seated Scapular Retraction  - 1 x daily - 7 x weekly - 3 sets - 10 reps - 5 hold  - Corner Pec Major Stretch  - 1 x daily - 7 x weekly - 3 reps - 30-60 sec hold  - Seated Shoulder Flexion with Resistance  - 1 x daily - 7 x weekly - 2 sets - 10 reps  - Seated Shoulder Abduction with Resistance  - 1 x daily - 7 x weekly - 2 sets - 10 reps  - Seated Single Arm Shoulder External Rotation with Self-Anchored Resistance  - 1 x daily - 7 x weekly - 2 sets - 10 reps  - First Rib Mobilization with Strap  - 1 x daily - 7 x weekly - 5 reps - 10-15 sec hold  Added on 10/8:  - Ulnar Nerve Flossing  - 1 x daily - 7 x weekly - 3 sets - 10 reps  - Median Nerve Flossing  - 1 x daily - 7 x weekly - 3 sets - 10 reps  - Radial Nerve Flossing  - 1 x daily - 7 x weekly - 3 sets - 10 reps   Timed Minutes        Total Timed Treatment:     55   mins  Total Time of Visit:             55  mins         ASSESSMENT/PLAN     GOALS   Goals                                                    Progress Note due by 2/6/25                                                                Recert due by 3/17/25   STG by: 4 weeks Comments Date Status   Decrease subjective pain to 2/10 3/10 1/7  Progressing   Decreased muscle guarding  throughout shoulder girdle  Decreased muscle guarding of the shoulder 11/14  MET   LTG by: 8 weeks         Symmetrical valerie shoulder flexion and abduction to at least 150 deg actively to improve ability to reach into overhead cabinets Flexion R 164 L 178 degrees, abduction R 165, L 160 degrees  9/20  Met   Able to resume household and work activities without exacerbation of symptoms Reports that he is able to do what he  needs to get done, though experiences fatigue with prolonged tasks like scrubbing   1/7 MET   Gross shoulder MMT at least 4+/5 to improve ability to lift items and groceries at home  See table 9/20 Met   Understands improved ergonomics for work and HEP for flexibility and stability Performs daily  1/7 ongoing   Improve QuickDASH score to 20 or less for R  11/14: 54.55 R shoulder  12/10: 34.09  1/7: 34.09 1/7 progressing   Reports no pain except occasional minor twinges no more than 2/10 See STG#1 12/10 ongoing   Reports eliminated R UE radicular symptoms for 1 week Last occurrence 11/21 12/10 MET   Be independent with HEP for L shoulder and R UE radicular pain  performs daily 1/7 Ongoing   Improve cervical rotation to 65 degrees bilaterally with report of no pain 11/14: L 56, R 48 with pain   After treatment L 58 R 56    12/10: 58 deg B before treatment, After: R: 65 deg, L 70 deg  12/10 MET      Anticipated CPT codes: Therapeutic Exercise 39489, Manual Therapy 28174, Therapeutic Activity 57453, Neuromuscular ReEducation 37653, Self Care/Home Management 27394, Estim Attended 10964, and Estim Unattended 97986    Assessment & Plan       Assessment  Impairments: abnormal muscle tone, abnormal or restricted ROM, activity intolerance, lacks appropriate home exercise program and pain with function   Functional limitations: lifting, uncomfortable because of pain, reaching behind back, reaching overhead and unable to perform repetitive tasks   Prognosis: good    Plan  Therapy options: will be seen for skilled therapy services  Planned modality interventions: dry needling, low level laser therapy and TENS  Planned therapy interventions: soft tissue mobilization, manual therapy, stretching, strengthening, joint mobilization, functional ROM exercises, therapeutic activities, neuromuscular re-education, body mechanics training, flexibility and home exercise program  Frequency: 2x week  Duration in weeks: 6  Treatment plan  discussed with: patient         ASSESSMENT: Goals assessed for Progress Note Today. One additional goal met today for ability to perform household chores without increase in pain, for a total of 1/2 STG and 5/9 LT goals met at this time. His overall function and ROM is progressing well, and his c/c at this point is feelings of tightness in the right shoulder. He is able to complete all activities asked of him through treatment, though struggles with NMR tasks. The Pt would benefit from skilled PTx to decrease pain, improve functional mobility, progress toward goals, and increase overall quality of life.    PLAN:   Continue to work on strength and mobility of the shoulders and neck and progress these as tolerated.     Signature:  Krystyna Mccormack PT DPT, [place] License #: [id number]      Electronically signed on 1/7/2025        [address] [place] [address]  [phone]

## 2025-01-09 ENCOUNTER — HOSPITAL ENCOUNTER (OUTPATIENT)
Dept: PHYSICAL THERAPY | Facility: HOSPITAL | Age: 65
Setting detail: THERAPIES SERIES
Discharge: HOME | End: 2025-01-09
Payer: OTHER GOVERNMENT

## 2025-01-09 DIAGNOSIS — M25.812 IMPINGEMENT OF LEFT SHOULDER: Primary | ICD-10-CM

## 2025-01-09 DIAGNOSIS — M54.12 RADICULOPATHY, CERVICAL: ICD-10-CM

## 2025-01-09 DIAGNOSIS — G89.29 CHRONIC RIGHT SHOULDER PAIN: ICD-10-CM

## 2025-01-09 DIAGNOSIS — G89.29 CHRONIC LEFT SHOULDER PAIN: ICD-10-CM

## 2025-01-09 DIAGNOSIS — M25.512 CHRONIC LEFT SHOULDER PAIN: ICD-10-CM

## 2025-01-09 DIAGNOSIS — M25.511 CHRONIC RIGHT SHOULDER PAIN: ICD-10-CM

## 2025-01-09 PROCEDURE — 97112 NEUROMUSCULAR REEDUCATION: CPT

## 2025-01-09 PROCEDURE — 97110 THERAPEUTIC EXERCISES: CPT

## 2025-01-09 NOTE — THERAPY TREATMENT NOTE
Physical Therapy Treatment Note  [facility]patient Therapy [facility]  A department [address], [place] [address]    Patient: Red Carver                                                 Visit Date: 2025  :     1960    Referring practitioner:    AME Wells*  Date of Initial Visit:          Type: THERAPY  Episode: L Shoulder impingement; R Shoulder Pain; R Cx radiculopathy  Number of visits this episode: 42    Visit Diagnoses:    ICD-10-CM ICD-9-CM   1. Impingement of left shoulder  M25.812 719.81   2. Chronic right shoulder pain  M25.511 719.41    G89.29 338.29   3. Radiculopathy, cervical  M54.12 723.4   4. Chronic left shoulder pain  M25.512 719.41    G89.29 338.29       SUBJECTIVE     Subjective: States his R shoulder and arm is still tight, noting he can tell it is getting stronger. Notes his L shoulder and his neck are feeling pretty good.    PAIN: 3/10 R with movement only      OBJECTIVE     Objective     Therapeutic Exercises    59183 Units Comments   SciFit BUE warmup  8 min  4 fwd, 4 bwd, switching every minute, lvl 4.5   Wall walks w/ end range sh flex; RTB X5 up/down    Scapular depression @ cybex L2 X10 ea    4 way ball throws w/ 3.3# ball X10 ea         Timed Minutes 30     Neuromuscular Reeducation     65711 Comments   Body blade: sh flex up/down & side/side; elbow bent side/side; active sh flex up/down 30 sec ea, ea shoulder   3.3# ball on wall in scaption, up/down & side/side X10 ea, ea shoulder       Timed Minutes 15       Therapy Education/Self Care 17657   Education offered today    [facility] Code [email or web address]   Ongoing HEP   Access Code: [email or web address]  URL: [email or web address]  Date: 2024  Prepared by: Elena Gill     Exercises  - Supine Shoulder Flexion AAROM with Hands Clasped  - 1 x daily - 7 x weekly - 3 sets - 10 reps  - Standing Shoulder Abduction AAROM with Dowel  - 1 x daily - 7 x weekly - 3 sets - 10 reps  -  Seated Scapular Retraction  - 1 x daily - 7 x weekly - 3 sets - 10 reps - 5 hold  - Corner Pec Major Stretch  - 1 x daily - 7 x weekly - 3 reps - 30-60 sec hold  - Seated Shoulder Flexion with Resistance  - 1 x daily - 7 x weekly - 2 sets - 10 reps  - Seated Shoulder Abduction with Resistance  - 1 x daily - 7 x weekly - 2 sets - 10 reps  - Seated Single Arm Shoulder External Rotation with Self-Anchored Resistance  - 1 x daily - 7 x weekly - 2 sets - 10 reps  - First Rib Mobilization with Strap  - 1 x daily - 7 x weekly - 5 reps - 10-15 sec hold  Added on 10/8:  - Ulnar Nerve Flossing  - 1 x daily - 7 x weekly - 3 sets - 10 reps  - Median Nerve Flossing  - 1 x daily - 7 x weekly - 3 sets - 10 reps  - Radial Nerve Flossing  - 1 x daily - 7 x weekly - 3 sets - 10 reps   Timed Minutes        Total Timed Treatment:     45   mins  Total Time of Visit:             45  mins         ASSESSMENT/PLAN     GOALS   Goals                                                    Progress Note due by 2/6/25                                                                Recert due by 3/17/25   STG by: 4 weeks Comments Date Status   Decrease subjective pain to 2/10 3/10 1/7  Progressing   Decreased muscle guarding  throughout shoulder girdle  Decreased muscle guarding of the shoulder 11/14  MET   LTG by: 8 weeks         Symmetrical valerie shoulder flexion and abduction to at least 150 deg actively to improve ability to reach into overhead cabinets Flexion R 164 L 178 degrees, abduction R 165, L 160 degrees  9/20  Met   Able to resume household and work activities without exacerbation of symptoms Reports that he is able to do what he needs to get done, though experiences fatigue with prolonged tasks like scrubbing   1/7 MET   Gross shoulder MMT at least 4+/5 to improve ability to lift items and groceries at home  See table 9/20 Met   Understands improved ergonomics for work and HEP for flexibility and stability Performs daily  1/7 ongoing    Improve QuickDASH score to 20 or less for R  11/14: 54.55 R shoulder  12/10: 34.09  1/7: 34.09 1/7 progressing   Reports no pain except occasional minor twinges no more than 2/10 See STG#1 12/10 ongoing   Reports eliminated R UE radicular symptoms for 1 week Last occurrence 11/21 12/10 MET   Be independent with HEP for L shoulder and R UE radicular pain  performs daily 1/7 Ongoing   Improve cervical rotation to 65 degrees bilaterally with report of no pain 11/14: L 56, R 48 with pain   After treatment L 58 R 56    12/10: 58 deg B before treatment, After: R: 65 deg, L 70 deg  12/10 MET      Anticipated CPT codes: Therapeutic Exercise 83949, Manual Therapy 73277, Therapeutic Activity 57508, Neuromuscular ReEducation 21391, Self Care/Home Management 24576, Estim Attended 66142, and Estim Unattended 19990    Assessment/Plan     ASSESSMENT: Pt reported cont R shoulder and arm tightness, noting it is mainly when he does too much but his Cx and L shoulder have been doing well. He noted no discomfort during exercises, but took some rest breaks d/t fatigue following Body Blade. He reported decreased pain and tightness with some improved mobility following treatment.    PLAN:   Continue to work on strength and mobility of the shoulders and neck and progress these as tolerated.     Signature:  Tyrone Acuña PTA, [place] License #: [id number]  Electronically signed on 1/9/2025        [address] [place] [address]. [address]  [phone]

## 2025-01-14 ENCOUNTER — HOSPITAL ENCOUNTER (OUTPATIENT)
Dept: PHYSICAL THERAPY | Facility: HOSPITAL | Age: 65
Setting detail: THERAPIES SERIES
Discharge: HOME | End: 2025-01-14
Payer: OTHER GOVERNMENT

## 2025-01-14 DIAGNOSIS — M25.511 CHRONIC RIGHT SHOULDER PAIN: ICD-10-CM

## 2025-01-14 DIAGNOSIS — G89.29 CHRONIC RIGHT SHOULDER PAIN: ICD-10-CM

## 2025-01-14 DIAGNOSIS — M54.12 RADICULOPATHY, CERVICAL: ICD-10-CM

## 2025-01-14 DIAGNOSIS — M25.812 IMPINGEMENT OF LEFT SHOULDER: Primary | ICD-10-CM

## 2025-01-14 PROCEDURE — 97110 THERAPEUTIC EXERCISES: CPT

## 2025-01-14 PROCEDURE — 97112 NEUROMUSCULAR REEDUCATION: CPT

## 2025-01-14 NOTE — THERAPY TREATMENT NOTE
Physical Therapy Treatment Note  [facility]patient Therapy Services  A [address], [place] [address]    Patient: Red Carver                                                 Visit Date: 2025  :     1960    Referring practitioner:    AME Welsl*  Date of Initial Visit:          Type: THERAPY  Episode: L Shoulder impingement; R Shoulder Pain; R Cx radiculopathy  Number of visits this episode: 43    Visit Diagnoses:    ICD-10-CM ICD-9-CM   1. Impingement of left shoulder  M25.812 719.81   2. Radiculopathy, cervical  M54.12 723.4   3. Chronic right shoulder pain  M25.511 719.41    G89.29 338.29     SUBJECTIVE     Subjective: He's been good, but a little cold, which makes him a bit stiff. Admits to a bit of muscle soreness following last visit. Denies pain during or following last treatment. Reports cont tightness/pain in R shoulder.     PAIN: 3/10 R shoulder      OBJECTIVE     Objective     Therapeutic Exercises    05205 Units Comments   SciFit BUE warmup  8 min  4 fwd, 4 bwd, switching every minute, lvl 4.5   Prone shoulder swimmers  2x10    Prone IYTW + chin tuck hold  X10 ea               Timed Minutes 20     Neuromuscular Reeducation     82726 Comments   Weighted med ball drop catch in flexion, scaption, abd 1.5 kg med ball, 2x10 ea    PNF Chops Cybex lvl 3  2x10   PNF lifts Cybex lvl 2  X10 ea            Timed Minutes 25     Therapy Education/Self Care [id number]   Education offered today    Halle Code [email or web address]   Ongoing HEP   Access Code: [email or web address]  URL: [email or web address]  Date: 2024  Prepared by: Elena Gill     Exercises  - Supine Shoulder Flexion AAROM with Hands Clasped  - 1 x daily - 7 x weekly - 3 sets - 10 reps  - Standing Shoulder Abduction AAROM with Dowel  - 1 x daily - 7 x weekly - 3 sets - 10 reps  - Seated Scapular Retraction  - 1 x daily - 7 x weekly - 3 sets - 10 reps - 5 hold  - Corner Pec Major  Stretch  - 1 x daily - 7 x weekly - 3 reps - 30-60 sec hold  - Seated Shoulder Flexion with Resistance  - 1 x daily - 7 x weekly - 2 sets - 10 reps  - Seated Shoulder Abduction with Resistance  - 1 x daily - 7 x weekly - 2 sets - 10 reps  - Seated Single Arm Shoulder External Rotation with Self-Anchored Resistance  - 1 x daily - 7 x weekly - 2 sets - 10 reps  - First Rib Mobilization with Strap  - 1 x daily - 7 x weekly - 5 reps - 10-15 sec hold  Added on 10/8:  - Ulnar Nerve Flossing  - 1 x daily - 7 x weekly - 3 sets - 10 reps  - Median Nerve Flossing  - 1 x daily - 7 x weekly - 3 sets - 10 reps  - Radial Nerve Flossing  - 1 x daily - 7 x weekly - 3 sets - 10 reps   Timed Minutes        Total Timed Treatment:     45   mins  Total Time of Visit:             45  mins         ASSESSMENT/PLAN     GOALS   Goals                                                    Progress Note due by 2/6/25                                                                Recert due by 3/17/25   STG by: 4 weeks Comments Date Status   Decrease subjective pain to 2/10 3/10 1/14  Progressing   Decreased muscle guarding  throughout shoulder girdle  Decreased muscle guarding of the shoulder 11/14  MET   LTG by: 8 weeks         Symmetrical valerei shoulder flexion and abduction to at least 150 deg actively to improve ability to reach into overhead cabinets Flexion R 164 L 178 degrees, abduction R 165, L 160 degrees  9/20  Met   Able to resume household and work activities without exacerbation of symptoms Reports that he is able to do what he needs to get done, though experiences fatigue with prolonged tasks like scrubbing   1/7 MET   Gross shoulder MMT at least 4+/5 to improve ability to lift items and groceries at home  See table 9/20 Met   Understands improved ergonomics for work and HEP for flexibility and stability Performs daily  1/7 ongoing   Improve QuickDASH score to 20 or less for R  11/14: 54.55 R shoulder  12/10: 34.09  1/7: 34.09 1/7  progressing   Reports no pain except occasional minor twinges no more than 2/10 See STG#1 12/10 ongoing   Reports eliminated R UE radicular symptoms for 1 week Last occurrence 11/21 12/10 MET   Be independent with HEP for L shoulder and R UE radicular pain  performs daily 1/7 Ongoing   Improve cervical rotation to 65 degrees bilaterally with report of no pain 11/14: L 56, R 48 with pain   After treatment L 58 R 56    12/10: 58 deg B before treatment, After: R: 65 deg, L 70 deg  12/10 MET      Anticipated CPT codes: Therapeutic Exercise 70023, Manual Therapy 56094, Therapeutic Activity 07096, Neuromuscular ReEducation 15742, Self Care/Home Management 92293, Estim Attended [id number], and Estim Unattended 98516    Assessment/Plan     ASSESSMENT: Red continues to present with subjective reported tightness and pain in R shoulder, though tolerates all activities without c/o pain. He understands that it it time to begin weaning from in clinic rehabilitation to more self care with HEP as he continues to progress.     PLAN:   Continue to work on strength and mobility of the shoulders and neck and progress these as tolerated.     Signature:  Krystyna Mccormack, PT DPT, [place] License #: [id number]    Electronically signed on 1/14/2025        [address] [address]  [phone]

## 2025-01-16 ENCOUNTER — HOSPITAL ENCOUNTER (OUTPATIENT)
Dept: PHYSICAL THERAPY | Facility: HOSPITAL | Age: 65
Setting detail: THERAPIES SERIES
Discharge: HOME | End: 2025-01-16
Payer: OTHER GOVERNMENT

## 2025-01-16 DIAGNOSIS — M54.12 RADICULOPATHY, CERVICAL: ICD-10-CM

## 2025-01-16 DIAGNOSIS — G89.29 CHRONIC RIGHT SHOULDER PAIN: ICD-10-CM

## 2025-01-16 DIAGNOSIS — M25.812 IMPINGEMENT OF LEFT SHOULDER: Primary | ICD-10-CM

## 2025-01-16 DIAGNOSIS — M25.511 CHRONIC RIGHT SHOULDER PAIN: ICD-10-CM

## 2025-01-16 PROCEDURE — 97112 NEUROMUSCULAR REEDUCATION: CPT

## 2025-01-16 PROCEDURE — 97110 THERAPEUTIC EXERCISES: CPT

## 2025-01-16 PROCEDURE — 97140 MANUAL THERAPY 1/> REGIONS: CPT

## 2025-01-16 NOTE — THERAPY TREATMENT NOTE
Physical Therapy Treatment Note  [facility] Outpatient Therapy Services  A department [address], [place] [address]    Patient: Red Carver                                                 Visit Date: 2025  :     1960    Referring practitioner:    AME Wells*  Date of Initial Visit:          Type: THERAPY  Episode: L Shoulder impingement; R Shoulder Pain; R Cx radiculopathy  Number of visits this episode: 44    Visit Diagnoses:    ICD-10-CM ICD-9-CM   1. Impingement of left shoulder  M25.812 719.81   2. Radiculopathy, cervical  M54.12 723.4   3. Chronic right shoulder pain  M25.511 719.41    G89.29 338.29       SUBJECTIVE     Subjective: Pt reports her shoulder hurts, but it is nothing new.     PAIN: 3/10 R shoulder      OBJECTIVE     Objective   Manual Therapy     67670  Comments   Prone PA joint mobilization to R shoulder to increase mobility in IR Grades 2-3                   Timed Minutes 10          Therapeutic Exercises    46561 Units Comments   SciFit BUE warmup  8 min  4 fwd, 4 bwd, switching every minute, lvl 5   IR sleeper stretch- R 3 x 30-45 sec    Shoulder IR around the worlds 2 x 10 ea direction              Timed Minutes 24     Neuromuscular Reeducation     65894 Comments   Weighted med ball drop catch in flexion, scaption, abd; ea direction  4.4 med ball, 2x10 ea                    Timed Minutes 9     Therapy Education/Self Care 59724   Education offered today    Halle Code [email or web address]   Ongoing HEP   Access Code: [email or web address]  URL: [email or web address]  Date: 2024  Prepared by: Elena Gill     Exercises  - Supine Shoulder Flexion AAROM with Hands Clasped  - 1 x daily - 7 x weekly - 3 sets - 10 reps  - Standing Shoulder Abduction AAROM with Dowel  - 1 x daily - 7 x weekly - 3 sets - 10 reps  - Seated Scapular Retraction  - 1 x daily - 7 x weekly - 3 sets - 10 reps - 5 hold  - Corner Pec Major Stretch  - 1 x daily - 7  x weekly - 3 reps - 30-60 sec hold  - Seated Shoulder Flexion with Resistance  - 1 x daily - 7 x weekly - 2 sets - 10 reps  - Seated Shoulder Abduction with Resistance  - 1 x daily - 7 x weekly - 2 sets - 10 reps  - Seated Single Arm Shoulder External Rotation with Self-Anchored Resistance  - 1 x daily - 7 x weekly - 2 sets - 10 reps  - First Rib Mobilization with Strap  - 1 x daily - 7 x weekly - 5 reps - 10-15 sec hold  Added on 10/8:  - Ulnar Nerve Flossing  - 1 x daily - 7 x weekly - 3 sets - 10 reps  - Median Nerve Flossing  - 1 x daily - 7 x weekly - 3 sets - 10 reps  - Radial Nerve Flossing  - 1 x daily - 7 x weekly - 3 sets - 10 reps   Timed Minutes        Total Timed Treatment:     43   mins  Total Time of Visit:             43  mins         ASSESSMENT/PLAN     GOALS   Goals                                                    Progress Note due by 2/6/25                                                                Recert due by 3/17/25   STG by: 4 weeks Comments Date Status   Decrease subjective pain to 2/10 3/10 1/14  Progressing   Decreased muscle guarding  throughout shoulder girdle  Decreased muscle guarding of the shoulder 11/14  MET   LTG by: 8 weeks         Symmetrical valerie shoulder flexion and abduction to at least 150 deg actively to improve ability to reach into overhead cabinets Flexion R 164 L 178 degrees, abduction R 165, L 160 degrees  9/20  Met   Able to resume household and work activities without exacerbation of symptoms Reports that he is able to do what he needs to get done, though experiences fatigue with prolonged tasks like scrubbing   1/7 MET   Gross shoulder MMT at least 4+/5 to improve ability to lift items and groceries at home  See table 9/20 Met   Understands improved ergonomics for work and HEP for flexibility and stability Performs daily  1/7 ongoing   Improve QuickDASH score to 20 or less for R  11/14: 54.55 R shoulder  12/10: 34.09  1/7: 34.09 1/7 progressing   Reports no pain  except occasional minor twinges no more than 2/10 See STG#1 12/10 ongoing   Reports eliminated R UE radicular symptoms for 1 week Last occurrence 11/21 12/10 MET   Be independent with HEP for L shoulder and R UE radicular pain  performs daily 1/7 Ongoing   Improve cervical rotation to 65 degrees bilaterally with report of no pain 11/14: L 56, R 48 with pain   After treatment L 58 R 56    12/10: 58 deg B before treatment, After: R: 65 deg, L 70 deg  12/10 MET      Anticipated CPT codes: Therapeutic Exercise 45230, Manual Therapy 78996, Therapeutic Activity 56655, Neuromuscular ReEducation 61480, Self Care/Home Management 76475, Estim Attended 68098, and Estim Unattended [id number]    Assessment/Plan     ASSESSMENT: Pt continues to present with tightness in his R UE, so we focused more on trying to increase mobility in the joint with mobilization and exercises. He did well and reported improvement in functional shoulder IR, which he demonstrated and seen visually.     PLAN:   Continue to work on strength and mobility of the shoulders and neck and progress these as tolerated.     Signature:  Terri Galvez PT DPT, [place] License #: [id number]    Electronically signed on 1/16/2025        [place] [address]  [phone]

## 2025-01-21 ENCOUNTER — HOSPITAL ENCOUNTER (OUTPATIENT)
Dept: PHYSICAL THERAPY | Facility: HOSPITAL | Age: 65
Setting detail: THERAPIES SERIES
Discharge: HOME | End: 2025-01-21
Payer: OTHER GOVERNMENT

## 2025-01-21 DIAGNOSIS — M54.12 RADICULOPATHY, CERVICAL: ICD-10-CM

## 2025-01-21 DIAGNOSIS — M25.812 IMPINGEMENT OF LEFT SHOULDER: Primary | ICD-10-CM

## 2025-01-21 DIAGNOSIS — G89.29 CHRONIC LEFT SHOULDER PAIN: ICD-10-CM

## 2025-01-21 DIAGNOSIS — M25.511 CHRONIC RIGHT SHOULDER PAIN: ICD-10-CM

## 2025-01-21 DIAGNOSIS — G89.29 CHRONIC RIGHT SHOULDER PAIN: ICD-10-CM

## 2025-01-21 DIAGNOSIS — M25.512 CHRONIC LEFT SHOULDER PAIN: ICD-10-CM

## 2025-01-21 PROCEDURE — 97530 THERAPEUTIC ACTIVITIES: CPT

## 2025-01-21 NOTE — THERAPY TREATMENT NOTE
Physical Therapy Treatment Note and Discharge Note  [facility] Outpatient Therapy Services  A department [address], [place] [address]    Patient: Red Carver                                                 Visit Date: 2025  :     1960    Referring practitioner:    AME Wells*  Date of Initial Visit:          Type: THERAPY  Episode: L Shoulder impingement; R Shoulder Pain; R Cx radiculopathy  Number of visits this episode: 45    Visit Diagnoses:    ICD-10-CM ICD-9-CM   1. Impingement of left shoulder  M25.812 719.81   2. Radiculopathy, cervical  M54.12 723.4   3. Chronic right shoulder pain  M25.511 719.41    G89.29 338.29   4. Chronic left shoulder pain  M25.512 719.41    G89.29 338.29       SUBJECTIVE     Subjective: Reports that he's been terrible. He had a depressing phone conversation trying to get more PT visits, and [facility] [place] [facility].     PAIN: 4/10 R shoulder      OBJECTIVE     Objective     Therapeutic Activities    80353 Comments   Remaining goals assessed for d/c                    Timed Minutes 15       Therapy Education/Self Care [id number]   Education offered today    [employer] Code [email or web address]   Ongoing HEP   Access Code: [email or web address]  URL: [email or web address]  Date: 2024  Prepared by: Elena Gill     Exercises  - Supine Shoulder Flexion AAROM with Hands Clasped  - 1 x daily - 7 x weekly - 3 sets - 10 reps  - Standing Shoulder Abduction AAROM with Dowel  - 1 x daily - 7 x weekly - 3 sets - 10 reps  - Seated Scapular Retraction  - 1 x daily - 7 x weekly - 3 sets - 10 reps - 5 hold  - Corner Pec Major Stretch  - 1 x daily - 7 x weekly - 3 reps - 30-60 sec hold  - Seated Shoulder Flexion with Resistance  - 1 x daily - 7 x weekly - 2 sets - 10 reps  - Seated Shoulder Abduction with Resistance  - 1 x daily - 7 x weekly - 2 sets - 10 reps  - Seated Single Arm Shoulder External Rotation with  Self-Anchored Resistance  - 1 x daily - 7 x weekly - 2 sets - 10 reps  - First Rib Mobilization with Strap  - 1 x daily - 7 x weekly - 5 reps - 10-15 sec hold  Added on 10/8:  - Ulnar Nerve Flossing  - 1 x daily - 7 x weekly - 3 sets - 10 reps  - Median Nerve Flossing  - 1 x daily - 7 x weekly - 3 sets - 10 reps  - Radial Nerve Flossing  - 1 x daily - 7 x weekly - 3 sets - 10 reps   Timed Minutes        Total Timed Treatment:     15   mins  Total Time of Visit:             20  mins         ASSESSMENT/PLAN     GOALS   Goals                                                    Progress Note due by 2/6/25                                                                Recert due by 3/17/25   STG by: 4 weeks Comments Date Status   Decrease subjective pain to 2/10 0/10 at rest, 4/10 with movement  1/21 Partially met   Decreased muscle guarding  throughout shoulder girdle  Decreased muscle guarding of the shoulder 11/14  MET   LTG by: 8 weeks         Symmetrical valerie shoulder flexion and abduction to at least 150 deg actively to improve ability to reach into overhead cabinets Flexion R 164 L 178 degrees, abduction R 165, L 160 degrees  9/20  Met   Able to resume household and work activities without exacerbation of symptoms Reports that he is able to do what he needs to get done, though experiences fatigue with prolonged tasks like scrubbing   1/7 MET   Gross shoulder MMT at least 4+/5 to improve ability to lift items and groceries at home  See table 9/20 Met   Understands improved ergonomics for work and HEP for flexibility and stability Performs daily  1/21 MET   Improve QuickDASH score to 20 or less for R  11/14: 54.55 R shoulder  12/10: 34.09  1/7: 34.09  1/21: 47.73 1/21 Not met    Reports no pain except occasional minor twinges no more than 2/10 See STG#1 1/21 Partially met   Reports eliminated R UE radicular symptoms for 1 week Last occurrence 11/21 12/10 MET   Be independent with HEP for L shoulder and R UE radicular  pain  performs daily 1/21 MET   Improve cervical rotation to 65 degrees bilaterally with report of no pain 11/14: L 56, R 48 with pain   After treatment L 58 R 56    12/10: 58 deg B before treatment, After: R: 65 deg, L 70 deg  12/10 MET      Anticipated CPT codes: Therapeutic Exercise 54219, Manual Therapy 26886, Therapeutic Activity 50101, Neuromuscular ReEducation 99879, Self Care/Home Management 05184, Estim Attended 97149, and Estim Unattended 83188    Assessment/Plan     ASSESSMENT: Remaining goals assessed for D/C today due to [facility] denial. At this time, Red has met or partially met 2/2 STG and 8/9 LTG, with his CC continuing to be pain with function.  At this point, Red has the tools he needs to progress independently with HEP.       PLAN:   D/C to HEP    Signature:  Krystyna Mccormack PT DPT, [place] License #: [id number]    DISCHARGE SUMMARY   Discharge date 1/21/2025   Dates of this episode 7/10/24 through 1/24/25   Number of visits on this episode 45   Reason for discharge insurance denied further visits   Outcomes achieved Refer to the goals table for specifics on goals   Discharge plan Continue with current home exercise program as instructed   Summary of care See Assessment    Discharge instruction See plan      SIGNATURE: Krystyna Mccormack PT DPT, [place] License #: [id number]    Electronically Signed on 1/21/2025    Electronically signed on 1/21/2025        [facility] [address]  [place] [address]  [phone]

## 2025-03-27 ENCOUNTER — OFFICE VISIT (OUTPATIENT)
Dept: NEUROSURGERY | Facility: CLINIC | Age: 65
End: 2025-03-27
Payer: OTHER GOVERNMENT

## 2025-03-27 VITALS — HEIGHT: 72 IN | WEIGHT: 212.2 LBS | BODY MASS INDEX: 28.74 KG/M2

## 2025-03-27 DIAGNOSIS — M50.30 DEGENERATIVE CERVICAL DISC: ICD-10-CM

## 2025-03-27 DIAGNOSIS — G89.29 CHRONIC NECK PAIN: Primary | ICD-10-CM

## 2025-03-27 DIAGNOSIS — M54.2 CHRONIC NECK PAIN: Primary | ICD-10-CM

## 2025-03-27 DIAGNOSIS — E66.3 OVERWEIGHT WITH BODY MASS INDEX (BMI) OF 28 TO 28.9 IN ADULT: ICD-10-CM

## 2025-03-27 DIAGNOSIS — M54.12 CERVICAL RADICULOPATHY: ICD-10-CM

## 2025-03-27 NOTE — PROGRESS NOTES
"    Chief complaint:   Chief Complaint   Patient presents with    Neck Pain     Stroke in 2003- Constant numbness and pain all the way down neck and shoulder since.   [facility], last session in 01/25.  21 session with chiropractor.   No Pain Management         Subjective     HPI: This is a 64-year-old male gentleman who was referred to us by AME Castellanos for neck pain and right upper extremity radicular pain.  He is here to be evaluated today.  The patient says that his symptoms have been going on for over 20 years.  He does relate to having a stroke back in 2003 as well.  The patient says the pain in his neck is constant.  It is worse with bending and lifting and activity and better with laying down.  He has pain that radiates from his neck over to his right upper extremity in a radicular fashion all the way to his hand.  The pain in his arm is constant.  It has a same modifying factors as his neck.  He also does have intermittent numbness and tingling in his right hand that is more bothersome to the day.  It does not wake him up from sleeping at night.  Is been through physical therapy at [facility] as well as chiropractic treatments without any significant improvement.  He has not had any injections in his neck.  He did try gabapentin and Lyrica without any improvement.  He is right-hand dominant.  Is retired.  He is single.  Denies any tobacco.  He does drink alcohol occasionally.  He does use marijuana occasionally.    Review of Systems   Constitutional:  Positive for activity change.   Musculoskeletal:  Positive for arthralgias, back pain and myalgias.   Neurological:  Positive for numbness.   Psychiatric/Behavioral: Negative.     All other systems reviewed and are negative.       Past Medical History:   Diagnosis Date    Arthritis     Hypertension     Stroke     right, \"tightness, stiff\"     Past Surgical History:   Procedure Laterality Date    COLONOSCOPY      COLONOSCOPY " "N/A 8/11/2022    Procedure: COLONOSCOPY WITH ANESTHESIA;  Surgeon: Douglas Bertrand DO;  Location: [facility] ENDOSCOPY;  Service: Gastroenterology;  Laterality: N/A;  pre: screen  post:normal  Joshua Romero MD        ENDOSCOPY      ENDOSCOPY N/A 8/11/2022    Procedure: ESOPHAGOGASTRODUODENOSCOPY WITH ANESTHESIA;  Surgeon: Douglas Bertrand DO;  Location: [facility] ENDOSCOPY;  Service: Gastroenterology;  Laterality: N/A;  pre: reflux  post: normal  Joshua Romero MD     Family History   Problem Relation Age of Onset    Colon cancer Neg Hx     Colon polyps Neg Hx     Esophageal cancer Neg Hx      Social History     Tobacco Use    Smoking status: Former     Passive exposure: Past    Smokeless tobacco: Never   Vaping Use    Vaping status: Never Used   Substance Use Topics    Alcohol use: Yes     Comment: rare    Drug use: Yes     Types: Marijuana     (Not in a hospital admission)    Allergies:  Patient has no known allergies.    Objective      Vital Signs  Ht 182.9 cm (72.01\")   Wt 96.3 kg (212 lb 3.2 oz)   BMI 28.77 kg/m²     Physical Exam  Constitutional:       General: He is awake.      Appearance: Normal appearance. He is well-developed.   HENT:      Head: Normocephalic.   Eyes:      General: Lids are normal.      Extraocular Movements: Extraocular movements intact.      Conjunctiva/sclera: Conjunctivae normal.      Pupils: Pupils are equal, round, and reactive to light.   Pulmonary:      Effort: Pulmonary effort is normal.      Breath sounds: Normal breath sounds.   Musculoskeletal:         General: Normal range of motion.      Cervical back: Normal range of motion.   Skin:     General: Skin is warm.   Neurological:      Mental Status: He is alert and oriented to person, place, and time.      GCS: GCS eye subscore is 4. GCS verbal subscore is 5. GCS motor subscore is 6.      Cranial Nerves: No cranial nerve deficit.      Sensory: No sensory deficit.      Motor: Motor strength is normal.     Deep Tendon Reflexes: " Reflexes are normal and symmetric. Reflexes normal.   Psychiatric:         Speech: Speech normal.         Behavior: Behavior normal.         Thought Content: Thought content normal.         Neurological Exam  Mental Status  Awake and alert. Oriented to person, place and time. Oriented to person, place, and time. Speech is normal. Language is fluent with no aphasia. Attention and concentration are normal.    Cranial Nerves  CN I: Sense of smell is normal.  CN II: Right normal visual field. Left normal visual field.  CN III, IV, VI: Extraocular movements intact bilaterally. Normal lids and orbits bilaterally. Pupils equal round and reactive to light bilaterally.  CN V: Facial sensation is normal.  CN VII:  Right: There is no facial weakness.  Left: There is no facial weakness.  CN XI: Shoulder shrug strength is normal.  CN XII: Tongue midline without atrophy or fasciculations.    Motor  Normal muscle bulk throughout. Normal muscle tone. Strength is 5/5 throughout all four extremities.    Sensory  Sensation is intact to light touch, pinprick, vibration and proprioception in all four extremities.    Reflexes  Deep tendon reflexes are 2+ and symmetric in all four extremities.    Gait  Normal casual, toe, heel and tandem gait.      Imaging review: X-rays of the cervical spine that was done on February 6, 2025 shows disc degeneration at C4-5, C5-6 and C6/7.  There is loss of the normal cervical lordosis.  No fracture visualized.        MRI of the cervical spine that was done on February 6, 2025 shows loss of the normal cervical lordosis.  At C3-4 there is left-sided foraminal narrowing.  At see 4-5 disc degeneration with bilateral foraminal narrowing with the left being worse than the right.  C5-6 disc degeneration with central canal narrowing and bilateral foraminal narrowing.  At C6/7 mild disc degeneration.  No fracture visualized.  No cord signal change.    C3-4      C4-5      C5-6      Assessment/Plan: The patient is  complaining of neck pain with right upper extremity pain.  The patient does have disc degeneration and foraminal stenosis that is likely responsible for his symptoms.  He has been through therapy and chiropractic treatments without improvement.  I am going to start the patient on diclofenac to see if this will help with his pain.  Will also make referral to pain management to see if this will help with his symptoms as well I will have him follow-up with Dr. Burns the next available appointment.  He was told to call us if any further problems or concerns.  Patient is a nonsmoker  The patient's Body mass index is 28.77 kg/m².. BMI is above normal parameters. Recommendations include: educational material and nutrition counseling  Advance Care Planning   ACP discussion was held with the patient during this visit. Patient does not have an advance directive, information provided.  STEADI Fall Risk Assessment was completed, and patient is at LOW risk for falls.Assessment completed on:3/27/2025       Diagnoses and all orders for this visit:    1. Chronic neck pain (Primary)  -     Ambulatory Referral to Pain Management Clinic    2. Cervical radiculopathy  -     Ambulatory Referral to Pain Management Clinic    3. Degenerative cervical disc  -     Ambulatory Referral to Pain Management Clinic    4. Overweight with body mass index (BMI) of 28 to 28.9 in adult    Other orders  -     diclofenac (VOLTAREN) 50 MG EC tablet; Take 1 tablet by mouth 2 (Two) Times a Day.  Dispense: 60 tablet; Refill: 2          I discussed the patients findings and my recommendations with patient    Sin Yung, APRN  03/27/25  09:15 CDT

## 2025-03-27 NOTE — PATIENT INSTRUCTIONS
"DASH Eating Plan  DASH stands for Dietary Approaches to Stop Hypertension. The DASH eating plan is a healthy eating plan that has been shown to:  Lower high blood pressure (hypertension).  Reduce your risk for type 2 diabetes, heart disease, and stroke.  Help with weight loss.  What are tips for following this plan?  Reading food labels  Check food labels for the amount of salt (sodium) per serving. Choose foods with less than 5 percent of the Daily Value (DV) of sodium. In general, foods with less than 300 milligrams (mg) of sodium per serving fit into this eating plan.  To find whole grains, look for the word \"whole\" as the first word in the ingredient list.  Shopping  Buy products labeled as \"low-sodium\" or \"no salt added.\"  Buy fresh foods. Avoid canned foods and pre-made or frozen meals.  Cooking  Try not to add salt when you cook. Use salt-free seasonings or herbs instead of table salt or sea salt. Check with your health care provider or pharmacist before using salt substitutes.  Do not figueroa foods. Cook foods in healthy ways, such as baking, boiling, grilling, roasting, or broiling.  Cook using oils that are good for your heart. These include olive, canola, avocado, soybean, and sunflower oil.  Meal planning    Eat a balanced diet. This should include:  4 or more servings of fruits and 4 or more servings of vegetables each day. Try to fill half of your plate with fruits and vegetables.  6-8 servings of whole grains each day.  6 or less servings of lean meat, poultry, or fish each day. 1 oz is 1 serving. A 3 oz (85 g) serving of meat is about the same size as the palm of your hand. One egg is 1 oz (28 g).  2-3 servings of low-fat dairy each day. One serving is 1 cup (237 mL).  1 serving of nuts, seeds, or beans 5 times each week.  2-3 servings of heart-healthy fats. Healthy fats called omega-3 fatty acids are found in foods such as walnuts, flaxseeds, fortified milks, and eggs. These fats are also found in " cold-water fish, such as sardines, salmon, and mackerel.  Limit how much you eat of:  Canned or prepackaged foods.  Food that is high in trans fat, such as fried foods.  Food that is high in saturated fat, such as fatty meat.  Desserts and other sweets, sugary drinks, and other foods with added sugar.  Full-fat dairy products.  Do not salt foods before eating.  Do not eat more than 4 egg yolks a week.  Try to eat at least 2 vegetarian meals a week.  Eat more home-cooked food and less restaurant, buffet, and fast food.  Lifestyle  When eating at a restaurant, ask if your food can be made with less salt or no salt.  If you drink alcohol:  Limit how much you have to:  0-1 drink a day if you are female.  0-2 drinks a day if you are male.  Know how much alcohol is in your drink. In the [place] one drink is one 12 oz bottle of beer (355 mL), one 5 oz glass of wine (148 mL), or one 1½ oz glass of hard liquor (44 mL).  General information  Avoid eating more than 2,300 mg of salt a day. If you have hypertension, you may need to reduce your sodium intake to 1,500 mg a day.  Work with your provider to stay at a healthy body weight or lose weight. Ask what the best weight range is for you.  On most days of the week, get at least 30 minutes of exercise that causes your heart to beat faster. This may include walking, swimming, or biking.  Work with your provider or dietitian to adjust your eating plan to meet your specific calorie needs.  What foods should I eat?  Fruits  All fresh, dried, or frozen fruit. Canned fruits that are in their natural juice and do not have sugar added to them.  Vegetables  Fresh or frozen vegetables that are raw, steamed, roasted, or grilled. Low-sodium or reduced-sodium tomato and vegetable juice. Low-sodium or reduced-sodium tomato sauce and tomato paste. Low-sodium or reduced-sodium canned vegetables.  Grains  Whole-grain or whole-wheat bread. Whole-grain or whole-wheat pasta. Brown rice. Oatmeal.  Quinoa. Bulgur. Whole-grain and low-sodium cereals. Shruthi bread. Low-fat, low-sodium crackers. Whole-wheat flour tortillas.  Meats and other proteins  Skinless chicken or turkey. Ground chicken or turkey. Pork with fat trimmed off. Fish and seafood. Egg whites. Dried beans, peas, or lentils. Unsalted nuts, nut butters, and seeds. Unsalted canned beans. Lean cuts of beef with fat trimmed off. Low-sodium, lean precooked or cured meat, such as sausages or meat loaves.  Dairy  Low-fat (1%) or fat-free (skim) milk. Reduced-fat, low-fat, or fat-free cheeses. Nonfat, low-sodium ricotta or cottage cheese. Low-fat or nonfat yogurt. Low-fat, low-sodium cheese.  Fats and oils  Soft margarine without trans fats. Vegetable oil. Reduced-fat, low-fat, or light mayonnaise and salad dressings (reduced-sodium). Canola, safflower, olive, avocado, soybean, and sunflower oils. Avocado.  Seasonings and condiments  Herbs. Spices. Seasoning mixes without salt.  Other foods  Unsalted popcorn and pretzels. Fat-free sweets.  The items listed above may not be all the foods and drinks you can have. Talk to a dietitian to learn more.  What foods should I avoid?  Fruits  Canned fruit in a light or heavy syrup. Fried fruit. Fruit in cream or butter sauce.  Vegetables  Creamed or fried vegetables. Vegetables in a cheese sauce. Regular canned vegetables that are not marked as low-sodium or reduced-sodium. Regular canned tomato sauce and paste that are not marked as low-sodium or reduced-sodium. Regular tomato and vegetable juices that are not marked as low-sodium or reduced-sodium. Pickles. Olives.  Grains  Baked goods made with fat, such as croissants, muffins, or some breads. Dry pasta or rice meal packs.  Meats and other proteins  Fatty cuts of meat. Ribs. Fried meat. Lucio. Bologna, salami, and other precooked or cured meats, such as sausages or meat loaves, that are not lean and low in sodium. Fat from the back of a pig (fatback). Alyx.  Salted nuts and seeds. Canned beans with added salt. Canned or smoked fish. Whole eggs or egg yolks. Chicken or turkey with skin.  Dairy  Whole or 2% milk, cream, and half-and-half. Whole or full-fat cream cheese. Whole-fat or sweetened yogurt. Full-fat cheese. Nondairy creamers. Whipped toppings. Processed cheese and cheese spreads.  Fats and oils  Butter. Stick margarine. Lard. Shortening. Ghee. Lucio fat. Tropical oils, such as coconut, palm kernel, or palm oil.  Seasonings and condiments  Onion salt, garlic salt, seasoned salt, table salt, and sea salt. Worcestershire sauce. Tartar sauce. Barbecue sauce. Teriyaki sauce. Soy sauce, including reduced-sodium soy sauce. Steak sauce. Canned and packaged gravies. Fish sauce. Oyster sauce. Cocktail sauce. Store-bought horseradish. Ketchup. Mustard. Meat flavorings and tenderizers. Bouillon cubes. Hot sauces. Pre-made or packaged marinades. Pre-made or packaged taco seasonings. Relishes. Regular salad dressings.  Other foods  Salted popcorn and pretzels.  The items listed above may not be all the foods and drinks you should avoid. Talk to a dietitian to learn more.  Where to find more information  National Heart, Lung, and Blood [place] (NHLBI): nhlbi.nih.gov  American Heart Association (AHA): heart.org  Academy of Nutrition and Dietetics: eatright.org  National Kidney Foundation (NKF): kidney.org  This information is not intended to replace advice given to you by your health care provider. Make sure you discuss any questions you have with your health care provider.  Document Revised: 01/04/2024 Document Reviewed: 01/04/2024  Elsevier Patient Education © 2024 [employer]BMI for Adults  Body mass index (BMI) is a number found using a person's weight and height. BMI can help tell how much of a person's weight is made up of fat. BMI does not measure body fat directly. It is used instead of tests that directly measure body fat, which can be difficult and  "expensive.  What are BMI measurements used for?  BMI is useful to:  Find out if your weight puts you at higher risk for medical problems.  Help recommend changes, such as in diet and exercise. This can help you reach a healthy weight. BMI screening can be done again to see if these changes are working.  How is BMI calculated?  Your height and weight are measured. The BMI is found from those numbers. This can be done with U[place] or metric measurements. Note that charts and online BMI calculators are available to help you find your BMI quickly and easily without doing these calculations.  To calculate your BMI in [place] measurements:  Measure your weight in pounds (lb).  Multiply the number of pounds by 703.  So, for an adult who weighs 150 lb, multiply that number by 703: 150 x 703, which equals 105,450.  Measure your height in inches. Then multiply that number by itself to get a measurement called \"inches squared.\"  So, for an adult who is 70 inches tall, the \"inches squared\" measurement is 70 inches x 70 inches, which equals 4,900 inches squared.  Divide the total from step 2 (number of lb x 703) by the total from step 3 (inches squared): 105,450 ÷ 4,900 = 21.5. This is your BMI.  To calculate your BMI in metric measurements:    Measure your weight in kilograms (kg).  For this example, the weight is 70 kg.  Measure your height in meters (m). Then multiply that number by itself to get a measurement called \"meters squared.\"  So, for an adult who is 1.75 m tall, the \"meters squared\" measurement is 1.75 m x 1.75 m, which equals 3.1 meters squared.  Divide the number of kilograms (your weight) by the meters squared number. In this example: 70 ÷ 3.1 = 22.6. This is your BMI.  What do the results mean?  BMI charts are used to see if you are underweight, normal weight, overweight, or obese. The following guidelines will be used:  Underweight: BMI less than 18.5.  Normal weight: BMI between 18.5 and 24.9.  Overweight: BMI " between 25 and 29.9.  Obese: BMI of 30 or above.  BMI is a tool and cannot diagnose a condition. Talk with your health care provider about what your BMI means for you. Keep these notes in mind:  Weight includes fat and muscle. Someone with a muscular build, such as an athlete, may have a BMI that is higher than 24.9. In cases like these, BMI is not a correct measure of body fat.  If you have a BMI of 25 or higher, your provider may need to do more testing to find out if excess body fat is the cause.  BMI is measured the same way for males and females. Females usually have more body fat than males of the same height and weight.  Where to find more information  For more information about BMI, including tools to quickly find your BMI, go to:  Centers for Disease Control and Prevention: cdc.gov  American Heart Association: heart.org  National Heart, Lung, and Blood [place]: nhlbi.nih.gov  This information is not intended to replace advice given to you by your health care provider. Make sure you discuss any questions you have with your health care provider.  Document Revised: 09/07/2023 Document Reviewed: 08/31/2023  Elsevier Patient Education © 2024 Elsevier Inc.

## 2025-05-15 NOTE — PROGRESS NOTES
"Subjective    Mr. Carver is 64 y.o. male    Chief Complaint: Elevated PSA    History of Present Illness  Patient with a history of elevated PSA.  Most recent PSA 4.9 April 8, 2025.  Previous PSA 3.8.  Patient then had a CT with contrast done for his elevated which revealed an enlarged prostate and a median lobe versus a prostate nodule.  PSA patient is maintained on Flomax.  AUA 6/35 with frequency, nocturia X 3.  Denies family history of prostate cancer.  Denies previous prostate biopsy.     PSA- 4.9 (4/8/2025)  PSA- 3.8 (12/5/2024)  PSA- 3.8 (6/5/2024)  The following portions of the patient's history were reviewed and updated as appropriate: allergies, current medications, past family history, past medical history, past social history, past surgical history and problem list.    Review of Systems      Current Outpatient Medications:     ciprofloxacin (CIPRO) 500 MG tablet, Take 1 tablet by mouth 2 (Two) Times a Day., Disp: 20 tablet, Rfl: 0    diclofenac (VOLTAREN) 50 MG EC tablet, Take 1 tablet by mouth 2 (Two) Times a Day., Disp: 60 tablet, Rfl: 2    gabapentin (NEURONTIN) 300 MG capsule, Take 1 capsule by mouth 3 (Three) Times a Day., Disp: , Rfl:     HYDROcodone-acetaminophen (NORCO) 7.5-325 MG per tablet, Take 1 tablet by mouth Every 8 (Eight) Hours As Needed for Moderate Pain., Disp: 10 tablet, Rfl: 0    metroNIDAZOLE (FLAGYL) 500 MG tablet, Take 1 tablet by mouth 3 (Three) Times a Day., Disp: 30 tablet, Rfl: 0    ondansetron ODT (ZOFRAN-ODT) 4 MG disintegrating tablet, Place 1 tablet on the tongue Every 8 (Eight) Hours As Needed for Nausea or Vomiting., Disp: 12 tablet, Rfl: 0    triamterene-hydrochlorothiazide (MAXZIDE) 75-50 MG per tablet, Take 1 tablet by mouth Daily., Disp: , Rfl:     Past Medical History:   Diagnosis Date    Arthritis     Hypertension     Stroke     right, \"tightness, stiff\"       Past Surgical History:   Procedure Laterality Date    COLONOSCOPY      COLONOSCOPY N/A 8/11/2022    " Procedure: COLONOSCOPY WITH ANESTHESIA;  Surgeon: Douglas Bertrand DO;  Location: [facility] ENDOSCOPY;  Service: Gastroenterology;  Laterality: N/A;  pre: screen  post:normal  Joshua Romero MD        ENDOSCOPY      ENDOSCOPY N/A 8/11/2022    Procedure: ESOPHAGOGASTRODUODENOSCOPY WITH ANESTHESIA;  Surgeon: Douglas Bertrand DO;  Location:  [facility] ENDOSCOPY;  Service: Gastroenterology;  Laterality: N/A;  pre: reflux  post: normal  Joshua Romero MD       Social History     Socioeconomic History    Marital status: Single   Tobacco Use    Smoking status: Former     Passive exposure: Past    Smokeless tobacco: Never   Vaping Use    Vaping status: Never Used   Substance and Sexual Activity    Alcohol use: Yes     Comment: rare    Drug use: Yes     Types: Marijuana    Sexual activity: Defer       Family History   Problem Relation Age of Onset    Colon cancer Neg Hx     Colon polyps Neg Hx     Esophageal cancer Neg Hx        Objective    There were no vitals taken for this visit.    Physical Exam      CT independent review    The CT scan of the abdomen/pelvis done with and without contrast is available for me to review.  Treatment recommendations require an independent review.  First I scanned the liver, spleen, and bowel pattern.  The retroperitoneum including the major vessels and lymphatic packages are briefly reviewed.  This film has been reviewed by the radiologist to determine any nonurologic abnormalities that are present.  The kidneys are closely inspected for size, symmetry, contour, parenchymal thickness, perinephric reaction, presence of calcifications, and intrarenal dilation of the collecting system.  The ureters are inspected for their course, caliber, and any calcifications.  The bladder is inspected for its thickness, size, and presence of any calcifications.  This scan shows:    The right kidney appears renal cyst    The left kidney appears normal on this contrasted CT scan.  The renal parenchymal is  normal in thickness.  There are no solid masses or cysts.  There is no hydronephrosis.  There are no stones.      The bladder appears prostate enlarged with hyperenhancing nodule median lobe.       Results for orders placed or performed during the hospital encounter of 03/09/23   Comprehensive Metabolic Panel    Collection Time: 03/09/23 12:00 PM    Specimen: Blood   Result Value Ref Range    Glucose 175 (H) 65 - 99 mg/dL    BUN 16 8 - 23 mg/dL    Creatinine 0.99 0.76 - 1.27 mg/dL    Sodium 138 136 - 145 mmol/L    Potassium 3.8 3.5 - 5.2 mmol/L    Chloride 99 98 - 107 mmol/L    CO2 25.0 22.0 - 29.0 mmol/L    Calcium 9.6 8.6 - 10.5 mg/dL    Total Protein 8.6 (H) 6.0 - 8.5 g/dL    Albumin 4.7 3.5 - 5.2 g/dL    ALT (SGPT) 18 1 - 41 U/L    AST (SGOT) 22 1 - 40 U/L    Alkaline Phosphatase 70 39 - 117 U/L    Total Bilirubin 0.4 0.0 - 1.2 mg/dL    Globulin 3.9 gm/dL    A/G Ratio 1.2 g/dL    BUN/Creatinine Ratio 16.2 7.0 - 25.0    Anion Gap 14.0 5.0 - 15.0 mmol/L    eGFR 85.1 >60.0 mL/min/1.73   Lipase    Collection Time: 03/09/23 12:00 PM    Specimen: Blood   Result Value Ref Range    Lipase 44 13 - 60 U/L   CBC Auto Differential    Collection Time: 03/09/23 12:00 PM    Specimen: Blood   Result Value Ref Range    WBC 13.41 (H) 3.40 - 10.80 10*3/mm3    RBC 4.82 4.14 - 5.80 10*6/mm3    Hemoglobin 13.5 13.0 - 17.7 g/dL    Hematocrit 41.6 37.5 - 51.0 %    MCV 86.3 79.0 - 97.0 fL    MCH 28.0 26.6 - 33.0 pg    MCHC 32.5 31.5 - 35.7 g/dL    RDW 15.1 12.3 - 15.4 %    RDW-SD 47.5 37.0 - 54.0 fl    MPV 9.6 6.0 - 12.0 fL    Platelets 378 140 - 450 10*3/mm3    Neutrophil % 91.7 (H) 42.7 - 76.0 %    Lymphocyte % 4.5 (L) 19.6 - 45.3 %    Monocyte % 3.3 (L) 5.0 - 12.0 %    Eosinophil % 0.0 (L) 0.3 - 6.2 %    Basophil % 0.1 0.0 - 1.5 %    Immature Grans % 0.4 0.0 - 0.5 %    Neutrophils, Absolute 12.31 (H) 1.70 - 7.00 10*3/mm3    Lymphocytes, Absolute 0.60 (L) 0.70 - 3.10 10*3/mm3    Monocytes, Absolute 0.44 0.10 - 0.90 10*3/mm3     Eosinophils, Absolute 0.00 0.00 - 0.40 10*3/mm3    Basophils, Absolute 0.01 0.00 - 0.20 10*3/mm3    Immature Grans, Absolute 0.05 0.00 - 0.05 10*3/mm3    nRBC 0.0 0.0 - 0.2 /100 WBC   ECG 12 Lead Other; abdominal pain    Collection Time: 03/09/23 12:10 PM   Result Value Ref Range    QT Interval 402 ms    QTC Interval 483 ms   Urinalysis With Culture If Indicated - Urine, Clean Catch    Collection Time: 03/09/23 12:23 PM    Specimen: Urine, Clean Catch   Result Value Ref Range    Color, UA Yellow Yellow, Straw    Appearance, UA Clear Clear    pH, UA 6.5 5.0 - 8.0    Specific Gravity, UA 1.024 1.005 - 1.030    Glucose, UA Negative Negative    Ketones, UA Negative Negative    Bilirubin, UA Negative Negative    Blood, UA Negative Negative    Protein, UA Trace (A) Negative    Leuk Esterase, UA Negative Negative    Nitrite, UA Negative Negative    Urobilinogen, UA 0.2 E.U./dL 0.2 - 1.0 E.U./dL   IPSS Questionnaire (AUA-7):                Assessment and Plan    There are no diagnoses linked to this encounter.      This represents an undiagnosed  problem with uncertain prognosis.      I discussed elevated PSA with the patient today.  We discussed that PSA is a protein measured in the bloodstream that comes exclusively from the prostate gland.  I mentioned to him that all men with a prostate gland will have a certain PSA level.  We discussed this number can be compared to all men, or men of a certain age.  We can also follow trend of PSA which is called the PSA velocity.  We discussed the prostate cancer is a possible cause of PSA elevation, but benign etiologies such as infection, enlargement, aging, and inflammation should also be considered.  There is also convincing evidence that some patients will have a PSA that waxes and wanes completely unrelated to symptomatic disease of the prostate for cancer.  We discussed that some patients with a normal PSA may also have prostate cancer.  The necessity of digital rectal exam is  "also discussed.  We discussed the role of free to total PSA ratio.  It is explained that this test is done in hopes of avoiding unnecessary biopsies, but that a few patients with prostate cancer will have false-negative results when measuring there free PSA.  We also discussed that PSA, in many patients, varies considerably for unexplained reasons.  Sometimes repeating the PSA in a few months gives time for a \"correction \" to baseline.    We did discuss the natural course of prostate cancer in most patients.  It is explained that waiting to see what the results of this test*are very unlikely to affect the clinical course of the disease.  However, there are exceptions in the biology of each cancer.  The risks and possible benefits of transrectal ultrasound with biopsy of the prostate gland is also discussed.  I did explain that biopsy is the standard of care for diagnosing prostate cancer. The risks, alternatives, and benefits of this treatment recommendation are discussed.      Lastly we discussed use of multi parametric MRI in the \"pre-biopsy \"setting. The available evidence suggests that incorporation of prebiopsy MRI into the diagnostic pathway for a clinically suspected prostate cancer improves the diagnosis of clinically significant disease, reduces adverse effects from biopsy, and can potentially prevent unnecessary biopsies in some individuals. The PROMIS study, has shown that standard biopsy may miss up to half of clinically significant disease compared with 5 mm template mapping biopsies.  Furthermore 3 studies (PRECISION,4M&MRI-FIRST) have shown that MRI targeted biopsy is detect more clinically significant disease and reduce over detection of indolent disease whilst allowing between 1/3-1/2 of men to avoid immediate biopsy.  However, I did explain that studies have also shown that one fourth of the [place] grade 3+4 lesions (which may or may not be clinically significant) are not detected by the MRI. " However, there is no consensus on the appropriate selection of males for MRI prior to first TRUS prostate biopsy, and this is an area in evolution. Not surprisingly, guidelines from expert groups are variable:    ?Updated guidelines for prostate cancer diagnosis and management from the [place] for Health and Care Excellence (NICE) suggest offering multiparametric MRI as the first-line investigation for all people with suspected clinically localized prostate cancer.    ?National Comprehensive Cancer Network (NCCN) guidelines recommend consideration of MRI, but they do not provide any guidelines for selecting appropriate candidates [46].    ?American Urological Association (AUA) guidelines state that there are insufficient data to recommend routine MRI in every biopsy-naïve patient under consideration for prostate biopsy. Its use may be considered in males for whom the clinical indications for biopsy are uncertain (minimal PSA increase, abnormal digital rectal examination [NADJA] with normal PSA, or very young or old patients).     ?Updated year 2019 guidelines from the  Association of Urology (EAU) endorse MRI prior to initial biopsy.    ?Guidelines from [facility] ([facility]) do not advocate MRI prior to initial TRUS-guided biopsy.     The bottom line is that Prostate MRI with MRI-directed biopsy is increasingly regarded as a valuable tool for refining risk status for clinically meaningful prostate cancer. Multiparametric MRI-directed biopsy is more sensitive than systematic TRUS-guided techniques (particularly for anterior lesions) when used alone for detecting clinically significant prostate cancers and, importantly, for reducing the number of insignificant cancers diagnosed.  Patient has opted for a prebiopsy MRI.     Reviewed outside records summarized in HPI.  Plan for MRI with potential fusion biopsy if MRI positive.

## 2025-05-28 ENCOUNTER — PATIENT ROUNDING (BHMG ONLY) (OUTPATIENT)
Dept: UROLOGY | Facility: CLINIC | Age: 65
End: 2025-05-28
Payer: OTHER GOVERNMENT

## 2025-05-28 ENCOUNTER — OFFICE VISIT (OUTPATIENT)
Dept: UROLOGY | Facility: CLINIC | Age: 65
End: 2025-05-28
Payer: OTHER GOVERNMENT

## 2025-05-28 VITALS — HEIGHT: 72 IN | TEMPERATURE: 96.7 F | WEIGHT: 203.4 LBS | BODY MASS INDEX: 27.55 KG/M2

## 2025-05-28 DIAGNOSIS — N13.8 BPH WITH URINARY OBSTRUCTION: ICD-10-CM

## 2025-05-28 DIAGNOSIS — R97.20 ELEVATED PSA: Primary | ICD-10-CM

## 2025-05-28 DIAGNOSIS — N40.1 BPH WITH URINARY OBSTRUCTION: ICD-10-CM

## 2025-05-28 LAB
BILIRUB BLD-MCNC: NEGATIVE MG/DL
CLARITY, POC: CLEAR
COLOR UR: YELLOW
GLUCOSE UR STRIP-MCNC: ABNORMAL MG/DL
KETONES UR QL: NEGATIVE
LEUKOCYTE EST, POC: NEGATIVE
NITRITE UR-MCNC: NEGATIVE MG/ML
PH UR: 8.5 [PH] (ref 5–8)
PROT UR STRIP-MCNC: NEGATIVE MG/DL
RBC # UR STRIP: NEGATIVE /UL
SP GR UR: 1.01 (ref 1–1.03)
UROBILINOGEN UR QL: ABNORMAL

## 2025-05-28 RX ORDER — ATORVASTATIN CALCIUM 40 MG/1
40 TABLET, FILM COATED ORAL DAILY
COMMUNITY

## 2025-05-28 RX ORDER — TAMSULOSIN HYDROCHLORIDE 0.4 MG/1
1 CAPSULE ORAL DAILY
COMMUNITY

## 2025-05-28 RX ORDER — AMLODIPINE BESYLATE 2.5 MG/1
2.5 TABLET ORAL DAILY
COMMUNITY

## 2025-05-28 NOTE — PROGRESS NOTES
May 28, 2025    Hello, may I speak with Red Carver?    My name is Theodora    I am  with [facility]  [address], [address]  [place] [address] [phone].    Before we get started may I verify your date of birth? 1960    I am calling to officially welcome you to our practice and ask about your recent visit. Is this a good time to talk? yes    Tell me about your visit with us. What things went well?  It was a good visit       We're always looking for ways to make our patients' experiences even better. Do you have recommendations on ways we may improve?  no    Overall were you satisfied with your first visit to our practice? yes       I appreciate you taking the time to speak with me today. Is there anything else I can do for you? no      Thank you, and have a great day.

## 2025-06-10 ENCOUNTER — HOSPITAL ENCOUNTER (OUTPATIENT)
Dept: MRI IMAGING | Facility: HOSPITAL | Age: 65
Discharge: HOME | End: 2025-06-10
Admitting: UROLOGY
Payer: OTHER GOVERNMENT

## 2025-06-10 DIAGNOSIS — R97.20 ELEVATED PSA: ICD-10-CM

## 2025-06-10 PROCEDURE — 72197 MRI PELVIS W/O & W/DYE: CPT

## 2025-06-10 PROCEDURE — A9573 GADOPICLENOL 0.5 MMOL/ML SOLUTION: HCPCS | Performed by: UROLOGY

## 2025-06-10 PROCEDURE — 25510000001 GADOPICLENOL 0.5 MMOL/ML SOLUTION: Performed by: UROLOGY

## 2025-06-10 RX ADMIN — GADOPICLENOL 10 ML: 485.1 INJECTION INTRAVENOUS at 08:53

## 2025-06-23 NOTE — PROGRESS NOTES
Subjective    Mr. Carver is 64 y.o. male    Chief Complaint: Elevated PSA    History of Present Illness  Patient with a history of elevated PSA.  Most recent PSA 4.9 April 8, 2025.  Previous PSA 3.8.  Patient then had a CT with contrast done for his elevated which revealed an enlarged prostate and a median lobe versus a prostate nodule.  PSA patient is maintained on Flomax.  AUA 6/35 with frequency, nocturia X 3.  Denies family history of prostate cancer.  Denies previous prostate biopsy.  MRI June 2025 shows no PI-RADS 3 lesions or greater.  He had a volume of 53.8 cc.  PSA density 0.08.     PSA- 4.9 (4/8/2025)  PSA- 3.8 (12/5/2024)  PSA- 3.8 (6/5/2024)  The following portions of the patient's history were reviewed and updated as appropriate: allergies, current medications, past family history, past medical history, past social history, past surgical history and problem list.    Review of Systems      Current Outpatient Medications:     amLODIPine (NORVASC) 2.5 MG tablet, Take 1 tablet by mouth Daily., Disp: , Rfl:     atorvastatin (LIPITOR) 40 MG tablet, Take 1 tablet by mouth Daily., Disp: , Rfl:     diclofenac (VOLTAREN) 50 MG EC tablet, Take 1 tablet by mouth 2 (Two) Times a Day., Disp: 60 tablet, Rfl: 2    empagliflozin (JARDIANCE) 25 MG tablet tablet, Take  by mouth Daily., Disp: , Rfl:     gabapentin (NEURONTIN) 300 MG capsule, Take 1 capsule by mouth 3 (Three) Times a Day., Disp: , Rfl:     metFORMIN (GLUCOPHAGE) 500 MG tablet, Take 1 tablet by mouth 2 (Two) Times a Day With Meals., Disp: , Rfl:     pregabalin (LYRICA) 25 MG capsule, Take 1 capsule by mouth 2 (Two) Times a Day., Disp: , Rfl:     tamsulosin (FLOMAX) 0.4 MG capsule 24 hr capsule, Take 1 capsule by mouth Daily., Disp: , Rfl:     triamterene-hydrochlorothiazide (MAXZIDE) 75-50 MG per tablet, Take 1 tablet by mouth Daily., Disp: , Rfl:     vitamin D3 125 MCG (5000 UT) capsule capsule, Take 1 capsule by mouth Daily., Disp: , Rfl:     Past  "Medical History:   Diagnosis Date    Arthritis     Hypertension     Stroke     right, \"tightness, stiff\"       Past Surgical History:   Procedure Laterality Date    COLONOSCOPY      COLONOSCOPY N/A 08/11/2022    Procedure: COLONOSCOPY WITH ANESTHESIA;  Surgeon: Douglas Bertrand DO;  Location: [facility] ENDOSCOPY;  Service: Gastroenterology;  Laterality: N/A;  pre: screen  post:Joshua Cardozo MD        ENDOSCOPY      ENDOSCOPY N/A 08/11/2022    Procedure: ESOPHAGOGASTRODUODENOSCOPY WITH ANESTHESIA;  Surgeon: Douglas Bertrand DO;  Location: [facility] ENDOSCOPY;  Service: Gastroenterology;  Laterality: N/A;  pre: reflux  post: Joshua Cardozo MD    FOOT SURGERY Bilateral        Social History     Socioeconomic History    Marital status: Single   Tobacco Use    Smoking status: Former     Passive exposure: Past    Smokeless tobacco: Never   Vaping Use    Vaping status: Never Used   Substance and Sexual Activity    Alcohol use: Yes     Comment: rare    Drug use: Yes     Types: Marijuana    Sexual activity: Defer       Family History   Problem Relation Age of Onset    Colon cancer Neg Hx     Colon polyps Neg Hx     Esophageal cancer Neg Hx        Objective    There were no vitals taken for this visit.    Physical Exam        Results for orders placed or performed in visit on 05/28/25   POC Urinalysis Dipstick, Multipro    Collection Time: 05/28/25  8:38 AM    Specimen: Urine   Result Value Ref Range    Color Yellow Yellow, Straw, Dark Yellow, Jocelyn    Clarity, UA Clear Clear    Glucose, UA >=1000 mg/dL (3+) (A) Negative mg/dL    Bilirubin Negative Negative    Ketones, UA Negative Negative    Specific Gravity  1.015 1.005 - 1.030    Blood, UA Negative Negative    pH, Urine 8.5 (A) 5.0 - 8.0    Protein, POC Negative Negative mg/dL    Urobilinogen, UA 1 E.U./dL (A) Normal, 0.2 E.U./dL    Nitrite, UA Negative Negative    Leukocytes Negative Negative     Assessment and Plan    Diagnoses and all orders for this " visit:    1. Elevated PSA (Primary)  -     POC Urinalysis Dipstick, Multipro    2. BPH with urinary obstruction    I personally reviewed these MRI which shows a 58 cc with no lesions.  PSA density 0.08.    Discussed negative MRI with patient we will plan for follow-up to see me in 6 months with PSA.    Continue Flomax

## 2025-06-26 ENCOUNTER — TRANSCRIBE ORDERS (OUTPATIENT)
Dept: PHYSICAL THERAPY | Facility: HOSPITAL | Age: 65
End: 2025-06-26
Payer: OTHER GOVERNMENT

## 2025-06-26 DIAGNOSIS — M54.12 CERVICAL RADICULOPATHY: Primary | ICD-10-CM

## 2025-07-09 ENCOUNTER — OFFICE VISIT (OUTPATIENT)
Dept: UROLOGY | Facility: CLINIC | Age: 65
End: 2025-07-09
Payer: OTHER GOVERNMENT

## 2025-07-09 DIAGNOSIS — N13.8 BPH WITH URINARY OBSTRUCTION: ICD-10-CM

## 2025-07-09 DIAGNOSIS — N40.1 BPH WITH URINARY OBSTRUCTION: ICD-10-CM

## 2025-07-09 DIAGNOSIS — R97.20 ELEVATED PSA: Primary | ICD-10-CM

## 2025-07-09 LAB
BILIRUB BLD-MCNC: NEGATIVE MG/DL
CLARITY, POC: CLEAR
COLOR UR: YELLOW
GLUCOSE UR STRIP-MCNC: ABNORMAL MG/DL
KETONES UR QL: NEGATIVE
LEUKOCYTE EST, POC: NEGATIVE
NITRITE UR-MCNC: NEGATIVE MG/ML
PH UR: 6 [PH] (ref 5–8)
PROT UR STRIP-MCNC: NEGATIVE MG/DL
RBC # UR STRIP: NEGATIVE /UL
SP GR UR: 1.01 (ref 1–1.03)
UROBILINOGEN UR QL: ABNORMAL

## 2025-07-09 RX ORDER — PREGABALIN 25 MG/1
25 CAPSULE ORAL 2 TIMES DAILY
COMMUNITY

## 2025-07-14 ENCOUNTER — TELEPHONE (OUTPATIENT)
Dept: NEUROSURGERY | Facility: CLINIC | Age: 65
End: 2025-07-14
Payer: OTHER GOVERNMENT

## 2025-07-14 NOTE — TELEPHONE ENCOUNTER
Called to confirm patient's appt with Dr Munson on 7/24/25 @ 11 am- no answer so left a message to call me back      LISSETT MUNSON [facility]  CLINICAL COORDINATOR  DR KIERAN MUNSON  [facility]        IT IS OKAY FOR THE HUB TO DELIVER THIS INFORMATION TO THE PATIENT IF THEY RECEIVE THIS CALL BACK

## 2025-07-15 NOTE — TELEPHONE ENCOUNTER
Patient called back and has confirmed appt with Dr Burns.      LISSETT MUNSON [facility]  CLINICAL COORDINATOR  [facility]

## 2025-07-24 ENCOUNTER — OFFICE VISIT (OUTPATIENT)
Dept: NEUROSURGERY | Facility: CLINIC | Age: 65
End: 2025-07-24
Payer: OTHER GOVERNMENT

## 2025-07-24 VITALS — BODY MASS INDEX: 26.82 KG/M2 | WEIGHT: 198 LBS | HEIGHT: 72 IN

## 2025-07-24 DIAGNOSIS — M48.02 SPINAL STENOSIS IN CERVICAL REGION: Primary | ICD-10-CM

## 2025-07-24 DIAGNOSIS — M54.12 CERVICAL RADICULOPATHY: ICD-10-CM

## 2025-07-24 DIAGNOSIS — E66.3 OVERWEIGHT WITH BODY MASS INDEX (BMI) OF 26 TO 26.9 IN ADULT: ICD-10-CM

## 2025-07-24 DIAGNOSIS — F17.200 CURRENT SMOKER ON SOME DAYS: ICD-10-CM

## 2025-07-24 DIAGNOSIS — M50.30 DEGENERATION OF CERVICAL INTERVERTEBRAL DISC: ICD-10-CM

## 2025-07-24 PROCEDURE — 99214 OFFICE O/P EST MOD 30 MIN: CPT | Performed by: NEUROLOGICAL SURGERY

## 2025-07-24 NOTE — PROGRESS NOTES
SUBJECTIVE:  Patient ID: Red Carver is a 64 y.o. male is here today for follow-up.    Chief Complaint:neck pain  Chief Complaint   Patient presents with    PAIN MGMT FOLLOW UP     Patient with constant neck & RUE pain with intermittent RT hand N/T.  He tried Gabapentin & Lyrica without relief.  He did 45 sessions of therapy @ [facility] without any relief.  He has been working with [facility].       HPI  64-year-old gentleman that we have been following for neck pain and right upper extremity radicular pain numbness and tingling.  This been going on for a few years.  He has gone to an extensive course of physical therapy including cervical traction which gave him only short-term relief.  He is also been working with Dr. Guevara and has had a couple of injection treatments.  Injection treatments seemed to have helped his neck pain but did not really help his upper extremity radicular pain.  He has had his shoulders examined.  He was found to have a left orthopedic shoulder issue.  Otherwise has been treating this with anti-inflammatories and gabapentin and Lyrica.    The following portions of the patient's history were reviewed and updated as appropriate: allergies, current medications, past family history, past medical history, past social history, past surgical history and problem list.    OBJECTIVE:    Review of Systems   Musculoskeletal:  Positive for neck pain.   Neurological:  Positive for numbness.   All other systems reviewed and are negative.         Physical Exam  Constitutional:       General: He is awake.   Eyes:      Extraocular Movements: Extraocular movements intact.      Pupils: Pupils are equal, round, and reactive to light.   Neurological:      Motor: Motor strength is normal.     Deep Tendon Reflexes: Reflexes are normal and symmetric.   Psychiatric:         Speech: Speech normal.         Neurological Exam  Mental Status  Awake. Oriented to person, place and time. Speech is normal.  Language is fluent with no aphasia. Attention and concentration are normal.    Cranial Nerves  CN I: Sense of smell is normal.  CN II: Right normal visual field. Left normal visual field.  CN III, IV, VI: Extraocular movements intact bilaterally. Pupils equal round and reactive to light bilaterally.  CN V: Facial sensation is normal.  CN VII:  Right: There is no facial weakness.  Left: There is no facial weakness.  CN XI: Shoulder shrug strength is normal.  CN XII: Tongue midline without atrophy or fasciculations.    Motor  Normal muscle bulk throughout. Normal muscle tone. Strength is 5/5 throughout all four extremities.    Sensory  Sensation is intact to light touch, pinprick, vibration and proprioception in all four extremities.    Reflexes  Deep tendon reflexes are 2+ and symmetric in all four extremities.    Gait  Normal casual, toe, heel and tandem gait.      Independent Review of Radiographic Studies:       ASSESSMENT/PLAN:  The patient presents with neck pain and right upper extremity radicular pain.  I think this is due to the severely degenerated disks at C4-5 and C5-6 on the MRI.  There is severe foraminal stenosis at these levels.  He is gone through an extensive course of nonsurgical care including physical therapy, medical management and injection treatments.  At this point I think he would benefit from a anterior discectomy and fusion.  There is some benefits of anterior cervical discectomy and fusion were discussed which included but were not limited to infection, bleeding, paralysis, spinal fluid leak, bowel bladder incontinence, speech and swallow difficulty, stroke, coma, and death.  The patient acknowledged understanding this.  His questions or concerns were addressed.      1. Spinal stenosis in cervical region    2. Degeneration of cervical intervertebral disc    3. Cervical radiculopathy    4. Current smoker on some days    5. Overweight with body mass index (BMI) of 26 to 26.9 in adult         The patient's Body mass index is 26.85 kg/m².. BMI is above normal parameters. Recommendations include: educational material    Return for POSTOPERATIVELY.      Sushant Burns MD

## 2025-07-24 NOTE — PATIENT INSTRUCTIONS
"PATIENT TO CONTINUE TO FOLLOW UP WITH HIS PRIMARY CARE PROVIDER FOR YEARLY PHYSICAL EXAMS TO ENSURE COMPLETE HEALTH MAINTENANCE    BMI for Adults  Body mass index (BMI) is a number found using a person's weight and height. BMI can help tell how much of a person's weight is made up of fat. BMI does not measure body fat directly. It is used instead of tests that directly measure body fat, which can be difficult and expensive.  What are BMI measurements used for?  BMI is useful to:  Find out if your weight puts you at higher risk for medical problems.  Help recommend changes, such as in diet and exercise. This can help you reach a healthy weight. BMI screening can be done again to see if these changes are working.  How is BMI calculated?  Your height and weight are measured. The BMI is found from those numbers. This can be done with U[place] or metric measurements. Note that charts and online BMI calculators are available to help you find your BMI quickly and easily without doing these calculations.  To calculate your BMI in [place] measurements:  Measure your weight in pounds (lb).  Multiply the number of pounds by 703.  So, for an adult who weighs 150 lb, multiply that number by 703: 150 x 703, which equals 105,450.  Measure your height in inches. Then multiply that number by itself to get a measurement called \"inches squared.\"  So, for an adult who is 70 inches tall, the \"inches squared\" measurement is 70 inches x 70 inches, which equals 4,900 inches squared.  Divide the total from step 2 (number of lb x 703) by the total from step 3 (inches squared): 105,450 ÷ 4,900 = 21.5. This is your BMI.  To calculate your BMI in metric measurements:    Measure your weight in kilograms (kg).  For this example, the weight is 70 kg.  Measure your height in meters (m). Then multiply that number by itself to get a measurement called \"meters squared.\"  So, for an adult who is 1.75 m tall, the \"meters squared\" measurement is 1.75 m x 1.75 " m, which equals 3.1 meters squared.  Divide the number of kilograms (your weight) by the meters squared number. In this example: 70 ÷ 3.1 = 22.6. This is your BMI.  What do the results mean?  BMI charts are used to see if you are underweight, normal weight, overweight, or obese. The following guidelines will be used:  Underweight: BMI less than 18.5.  Normal weight: BMI between 18.5 and 24.9.  Overweight: BMI between 25 and 29.9.  Obese: BMI of 30 or above.  BMI is a tool and cannot diagnose a condition. Talk with your health care provider about what your BMI means for you. Keep these notes in mind:  Weight includes fat and muscle. Someone with a muscular build, such as an athlete, may have a BMI that is higher than 24.9. In cases like these, BMI is not a correct measure of body fat.  If you have a BMI of 25 or higher, your provider may need to do more testing to find out if excess body fat is the cause.  BMI is measured the same way for males and females. Females usually have more body fat than males of the same height and weight.  Where to find more information  For more information about BMI, including tools to quickly find your BMI, go to:  Centers for Disease Control and Prevention: cdc.gov  American Heart Association: heart.org  National Heart, Lung, and Blood [place]: nhlbi.nih.gov  This information is not intended to replace advice given to you by your health care provider. Make sure you discuss any questions you have with your health care provider.  Document Revised: 09/07/2023 Document Reviewed: 08/31/2023  [employer] Patient Education © 2024 [employer]DASH Eating Plan  DASH stands for Dietary Approaches to Stop Hypertension. The DASH eating plan is a healthy eating plan that has been shown to:  Lower high blood pressure (hypertension).  Reduce your risk for type 2 diabetes, heart disease, and stroke.  Help with weight loss.  What are tips for following this plan?  Reading food labels  Check food labels  "for the amount of salt (sodium) per serving. Choose foods with less than 5 percent of the Daily Value (DV) of sodium. In general, foods with less than 300 milligrams (mg) of sodium per serving fit into this eating plan.  To find whole grains, look for the word \"whole\" as the first word in the ingredient list.  Shopping  Buy products labeled as \"low-sodium\" or \"no salt added.\"  Buy fresh foods. Avoid canned foods and pre-made or frozen meals.  Cooking  Try not to add salt when you cook. Use salt-free seasonings or herbs instead of table salt or sea salt. Check with your health care provider or pharmacist before using salt substitutes.  Do not ifgueroa foods. Cook foods in healthy ways, such as baking, boiling, grilling, roasting, or broiling.  Cook using oils that are good for your heart. These include olive, canola, avocado, soybean, and sunflower oil.  Meal planning    Eat a balanced diet. This should include:  4 or more servings of fruits and 4 or more servings of vegetables each day. Try to fill half of your plate with fruits and vegetables.  6-8 servings of whole grains each day.  6 or less servings of lean meat, poultry, or fish each day. 1 oz is 1 serving. A 3 oz (85 g) serving of meat is about the same size as the palm of your hand. One egg is 1 oz (28 g).  2-3 servings of low-fat dairy each day. One serving is 1 cup (237 mL).  1 serving of nuts, seeds, or beans 5 times each week.  2-3 servings of heart-healthy fats. Healthy fats called omega-3 fatty acids are found in foods such as walnuts, flaxseeds, fortified milks, and eggs. These fats are also found in cold-water fish, such as sardines, salmon, and mackerel.  Limit how much you eat of:  Canned or prepackaged foods.  Food that is high in trans fat, such as fried foods.  Food that is high in saturated fat, such as fatty meat.  Desserts and other sweets, sugary drinks, and other foods with added sugar.  Full-fat dairy products.  Do not salt foods before " eating.  Do not eat more than 4 egg yolks a week.  Try to eat at least 2 vegetarian meals a week.  Eat more home-cooked food and less restaurant, buffet, and fast food.  Lifestyle  When eating at a restaurant, ask if your food can be made with less salt or no salt.  If you drink alcohol:  Limit how much you have to:  0-1 drink a day if you are female.  0-2 drinks a day if you are male.  Know how much alcohol is in your drink. In the [place] one drink is one 12 oz bottle of beer (355 mL), one 5 oz glass of wine (148 mL), or one 1½ oz glass of hard liquor (44 mL).  General information  Avoid eating more than 2,300 mg of salt a day. If you have hypertension, you may need to reduce your sodium intake to 1,500 mg a day.  Work with your provider to stay at a healthy body weight or lose weight. Ask what the best weight range is for you.  On most days of the week, get at least 30 minutes of exercise that causes your heart to beat faster. This may include walking, swimming, or biking.  Work with your provider or dietitian to adjust your eating plan to meet your specific calorie needs.  What foods should I eat?  Fruits  All fresh, dried, or frozen fruit. Canned fruits that are in their natural juice and do not have sugar added to them.  Vegetables  Fresh or frozen vegetables that are raw, steamed, roasted, or grilled. Low-sodium or reduced-sodium tomato and vegetable juice. Low-sodium or reduced-sodium tomato sauce and tomato paste. Low-sodium or reduced-sodium canned vegetables.  Grains  Whole-grain or whole-wheat bread. Whole-grain or whole-wheat pasta. Brown rice. Oatmeal. Quinoa. Bulgur. Whole-grain and low-sodium cereals. Shruthi bread. Low-fat, low-sodium crackers. Whole-wheat flour tortillas.  Meats and other proteins  Skinless chicken or turkey. Ground chicken or turkey. Pork with fat trimmed off. Fish and seafood. Egg whites. Dried beans, peas, or lentils. Unsalted nuts, nut butters, and seeds. Unsalted canned beans. Lean  cuts of beef with fat trimmed off. Low-sodium, lean precooked or cured meat, such as sausages or meat loaves.  Dairy  Low-fat (1%) or fat-free (skim) milk. Reduced-fat, low-fat, or fat-free cheeses. Nonfat, low-sodium ricotta or cottage cheese. Low-fat or nonfat yogurt. Low-fat, low-sodium cheese.  Fats and oils  Soft margarine without trans fats. Vegetable oil. Reduced-fat, low-fat, or light mayonnaise and salad dressings (reduced-sodium). Canola, safflower, olive, avocado, soybean, and sunflower oils. Avocado.  Seasonings and condiments  Herbs. Spices. Seasoning mixes without salt.  Other foods  Unsalted popcorn and pretzels. Fat-free sweets.  The items listed above may not be all the foods and drinks you can have. Talk to a dietitian to learn more.  What foods should I avoid?  Fruits  Canned fruit in a light or heavy syrup. Fried fruit. Fruit in cream or butter sauce.  Vegetables  Creamed or fried vegetables. Vegetables in a cheese sauce. Regular canned vegetables that are not marked as low-sodium or reduced-sodium. Regular canned tomato sauce and paste that are not marked as low-sodium or reduced-sodium. Regular tomato and vegetable juices that are not marked as low-sodium or reduced-sodium. Pickles. Olives.  Grains  Baked goods made with fat, such as croissants, muffins, or some breads. Dry pasta or rice meal packs.  Meats and other proteins  Fatty cuts of meat. Ribs. Fried meat. Lucio. Bologna, salami, and other precooked or cured meats, such as sausages or meat loaves, that are not lean and low in sodium. Fat from the back of a pig (fatback). Bratwurst. Salted nuts and seeds. Canned beans with added salt. Canned or smoked fish. Whole eggs or egg yolks. Chicken or turkey with skin.  Dairy  Whole or 2% milk, cream, and half-and-half. Whole or full-fat cream cheese. Whole-fat or sweetened yogurt. Full-fat cheese. Nondairy creamers. Whipped toppings. Processed cheese and cheese spreads.  Fats and oils  Butter.  Stick margarine. Lard. Shortening. Ghee. Lucio fat. Tropical oils, such as coconut, palm kernel, or palm oil.  Seasonings and condiments  Onion salt, garlic salt, seasoned salt, table salt, and sea salt. Worcestershire sauce. Tartar sauce. Barbecue sauce. Teriyaki sauce. Soy sauce, including reduced-sodium soy sauce. Steak sauce. Canned and packaged gravies. Fish sauce. Oyster sauce. Cocktail sauce. Store-bought horseradish. Ketchup. Mustard. Meat flavorings and tenderizers. Bouillon cubes. Hot sauces. Pre-made or packaged marinades. Pre-made or packaged taco seasonings. Relishes. Regular salad dressings.  Other foods  Salted popcorn and pretzels.  The items listed above may not be all the foods and drinks you should avoid. Talk to a dietitian to learn more.  Where to find more information  National Heart, Lung, and Blood [place] (NHLBI): nhlbi.nih.gov  American Heart Association (AHA): heart.org  Academy of Nutrition and Dietetics: eatright.org  National Kidney Foundation (NKF): kidney.org  This information is not intended to replace advice given to you by your health care provider. Make sure you discuss any questions you have with your health care provider.  Document Revised: 01/04/2024 Document Reviewed: 01/04/2024  [employer] Patient Education © 2024 [employer]

## 2025-07-28 ENCOUNTER — TELEPHONE (OUTPATIENT)
Dept: NEUROSURGERY | Facility: CLINIC | Age: 65
End: 2025-07-28
Payer: OTHER GOVERNMENT

## 2025-08-25 ENCOUNTER — PRE-ADMISSION TESTING (OUTPATIENT)
Dept: PREADMISSION TESTING | Facility: HOSPITAL | Age: 65
End: 2025-08-25
Payer: OTHER GOVERNMENT

## 2025-08-25 VITALS
HEART RATE: 113 BPM | WEIGHT: 198.85 LBS | RESPIRATION RATE: 20 BRPM | SYSTOLIC BLOOD PRESSURE: 122 MMHG | DIASTOLIC BLOOD PRESSURE: 88 MMHG | HEIGHT: 72 IN | OXYGEN SATURATION: 97 % | BODY MASS INDEX: 26.93 KG/M2

## 2025-08-25 LAB
ANION GAP SERPL CALCULATED.3IONS-SCNC: 16 MMOL/L (ref 5–15)
BUN SERPL-MCNC: 30.6 MG/DL (ref 8–23)
BUN/CREAT SERPL: 22.2 (ref 7–25)
CALCIUM SPEC-SCNC: 10.1 MG/DL (ref 8.6–10.5)
CHLORIDE SERPL-SCNC: 99 MMOL/L (ref 98–107)
CO2 SERPL-SCNC: 22 MMOL/L (ref 22–29)
CREAT SERPL-MCNC: 1.38 MG/DL (ref 0.76–1.27)
DEPRECATED RDW RBC AUTO: 48.4 FL (ref 37–54)
EGFRCR SERPLBLD CKD-EPI 2021: 56 ML/MIN/1.73
ERYTHROCYTE [DISTWIDTH] IN BLOOD BY AUTOMATED COUNT: 15.5 % (ref 12.3–15.4)
GLUCOSE SERPL-MCNC: 117 MG/DL (ref 65–99)
HCT VFR BLD AUTO: 47.5 % (ref 37.5–51)
HGB BLD-MCNC: 15.9 G/DL (ref 13–17.7)
MCH RBC QN AUTO: 29 PG (ref 26.6–33)
MCHC RBC AUTO-ENTMCNC: 33.5 G/DL (ref 31.5–35.7)
MCV RBC AUTO: 86.7 FL (ref 79–97)
PLATELET # BLD AUTO: 412 10*3/MM3 (ref 140–450)
PMV BLD AUTO: 10.3 FL (ref 6–12)
POTASSIUM SERPL-SCNC: 3.4 MMOL/L (ref 3.5–5.2)
QT INTERVAL: 346 MS
QTC INTERVAL: 446 MS
RBC # BLD AUTO: 5.48 10*6/MM3 (ref 4.14–5.8)
SODIUM SERPL-SCNC: 137 MMOL/L (ref 136–145)
WBC NRBC COR # BLD AUTO: 10.27 10*3/MM3 (ref 3.4–10.8)

## 2025-08-25 PROCEDURE — 93005 ELECTROCARDIOGRAM TRACING: CPT

## 2025-08-25 PROCEDURE — 85027 COMPLETE CBC AUTOMATED: CPT

## 2025-08-25 PROCEDURE — 36415 COLL VENOUS BLD VENIPUNCTURE: CPT

## 2025-08-25 PROCEDURE — 80048 BASIC METABOLIC PNL TOTAL CA: CPT

## (undated) DEVICE — MASK,OXYGEN,MED CONC,ADLT,7' TUB, UC: Brand: PENDING

## (undated) DEVICE — CONMED SCOPE SAVER BITE BLOCK, 20X27 MM: Brand: SCOPE SAVER

## (undated) DEVICE — YANKAUER,BULB TIP WITH VENT: Brand: ARGYLE

## (undated) DEVICE — Device: Brand: DEFENDO AIR/WATER/SUCTION AND BIOPSY VALVE

## (undated) DEVICE — FRCP BX RADJAW4 NDL 2.8 240 STD OG

## (undated) DEVICE — THE CHANNEL CLEANING BRUSH IS A NYLON FLEXI BRUSH ATTACHED TO A FLEXIBLE PLASTIC SHEATH DESIGNED TO SAFELY REMOVE DEBRIS FROM FLEXIBLE ENDOSCOPES.

## (undated) DEVICE — SENSR O2 OXIMAX FNGR A/ 18IN NONSTR

## (undated) DEVICE — TBG SMPL FLTR LINE NASL 02/C02 A/ BX/100

## (undated) DEVICE — CUFF,BP,DISP,1 TUBE,ADULT,HP: Brand: MEDLINE